# Patient Record
Sex: MALE | Race: WHITE | NOT HISPANIC OR LATINO | URBAN - METROPOLITAN AREA
[De-identification: names, ages, dates, MRNs, and addresses within clinical notes are randomized per-mention and may not be internally consistent; named-entity substitution may affect disease eponyms.]

---

## 2017-05-25 ENCOUNTER — LAB VISIT (OUTPATIENT)
Dept: LAB | Facility: HOSPITAL | Age: 42
End: 2017-05-25
Attending: FAMILY MEDICINE
Payer: COMMERCIAL

## 2017-05-25 ENCOUNTER — OFFICE VISIT (OUTPATIENT)
Dept: FAMILY MEDICINE | Facility: CLINIC | Age: 42
End: 2017-05-25
Attending: FAMILY MEDICINE
Payer: COMMERCIAL

## 2017-05-25 VITALS
OXYGEN SATURATION: 98 % | BODY MASS INDEX: 28.81 KG/M2 | WEIGHT: 231.69 LBS | SYSTOLIC BLOOD PRESSURE: 108 MMHG | TEMPERATURE: 99 F | DIASTOLIC BLOOD PRESSURE: 80 MMHG | HEIGHT: 75 IN | HEART RATE: 116 BPM

## 2017-05-25 DIAGNOSIS — Z00.00 LABORATORY EXAM ORDERED AS PART OF ROUTINE GENERAL MEDICAL EXAMINATION: ICD-10-CM

## 2017-05-25 DIAGNOSIS — R06.81 WITNESSED APNEIC SPELLS: ICD-10-CM

## 2017-05-25 DIAGNOSIS — R06.83 SNORING: ICD-10-CM

## 2017-05-25 DIAGNOSIS — Z00.00 ROUTINE GENERAL MEDICAL EXAMINATION AT A HEALTH CARE FACILITY: Primary | ICD-10-CM

## 2017-05-25 DIAGNOSIS — I10 ESSENTIAL HYPERTENSION: ICD-10-CM

## 2017-05-25 LAB
ALBUMIN SERPL BCP-MCNC: 4.3 G/DL
ALP SERPL-CCNC: 63 U/L
ALT SERPL W/O P-5'-P-CCNC: 23 U/L
ANION GAP SERPL CALC-SCNC: 12 MMOL/L
AST SERPL-CCNC: 20 U/L
BASOPHILS # BLD AUTO: 0.05 K/UL
BASOPHILS NFR BLD: 0.7 %
BILIRUB SERPL-MCNC: 0.9 MG/DL
BUN SERPL-MCNC: 24 MG/DL
CALCIUM SERPL-MCNC: 10 MG/DL
CHLORIDE SERPL-SCNC: 103 MMOL/L
CO2 SERPL-SCNC: 22 MMOL/L
COMPLEXED PSA SERPL-MCNC: 0.66 NG/ML
CREAT SERPL-MCNC: 1.3 MG/DL
DIFFERENTIAL METHOD: NORMAL
EOSINOPHIL # BLD AUTO: 0.5 K/UL
EOSINOPHIL NFR BLD: 7.2 %
ERYTHROCYTE [DISTWIDTH] IN BLOOD BY AUTOMATED COUNT: 12.6 %
EST. GFR  (AFRICAN AMERICAN): >60 ML/MIN/1.73 M^2
EST. GFR  (NON AFRICAN AMERICAN): >60 ML/MIN/1.73 M^2
GLUCOSE SERPL-MCNC: 97 MG/DL
HCT VFR BLD AUTO: 47.9 %
HGB BLD-MCNC: 16.2 G/DL
LYMPHOCYTES # BLD AUTO: 2.2 K/UL
LYMPHOCYTES NFR BLD: 31.3 %
MCH RBC QN AUTO: 29.3 PG
MCHC RBC AUTO-ENTMCNC: 33.8 %
MCV RBC AUTO: 87 FL
MONOCYTES # BLD AUTO: 0.4 K/UL
MONOCYTES NFR BLD: 6.2 %
NEUTROPHILS # BLD AUTO: 3.8 K/UL
NEUTROPHILS NFR BLD: 54.5 %
PLATELET # BLD AUTO: 250 K/UL
PMV BLD AUTO: 10.8 FL
POTASSIUM SERPL-SCNC: 3.9 MMOL/L
PROT SERPL-MCNC: 8 G/DL
RBC # BLD AUTO: 5.52 M/UL
SODIUM SERPL-SCNC: 137 MMOL/L
T4 FREE SERPL-MCNC: 1.42 NG/DL
TSH SERPL DL<=0.005 MIU/L-ACNC: 0.47 UIU/ML
WBC # BLD AUTO: 6.93 K/UL

## 2017-05-25 PROCEDURE — 85025 COMPLETE CBC W/AUTO DIFF WBC: CPT

## 2017-05-25 PROCEDURE — 83036 HEMOGLOBIN GLYCOSYLATED A1C: CPT

## 2017-05-25 PROCEDURE — 84443 ASSAY THYROID STIM HORMONE: CPT

## 2017-05-25 PROCEDURE — 99999 PR PBB SHADOW E&M-NEW PATIENT-LVL III: CPT | Mod: PBBFAC,,, | Performed by: FAMILY MEDICINE

## 2017-05-25 PROCEDURE — 84439 ASSAY OF FREE THYROXINE: CPT

## 2017-05-25 PROCEDURE — 80053 COMPREHEN METABOLIC PANEL: CPT

## 2017-05-25 PROCEDURE — 36415 COLL VENOUS BLD VENIPUNCTURE: CPT | Mod: PO

## 2017-05-25 PROCEDURE — 99386 PREV VISIT NEW AGE 40-64: CPT | Mod: S$GLB,,, | Performed by: FAMILY MEDICINE

## 2017-05-25 PROCEDURE — 80061 LIPID PANEL: CPT

## 2017-05-25 PROCEDURE — 84153 ASSAY OF PSA TOTAL: CPT

## 2017-05-25 RX ORDER — CHLORTHALIDONE 25 MG/1
25 TABLET ORAL DAILY
COMMUNITY
End: 2017-05-29 | Stop reason: SDUPTHER

## 2017-05-25 RX ORDER — LISINOPRIL 20 MG/1
20 TABLET ORAL NIGHTLY
COMMUNITY
End: 2017-05-29 | Stop reason: SDUPTHER

## 2017-05-25 NOTE — PROGRESS NOTES
Subjective:       Patient ID: RICK Sanford is a 41 y.o. male.    Chief Complaint: Establish Care    HPI     The patient presents to the office today requesting a routine periodic health examination.  This is his first visit with me.    Patient Active Problem List   Diagnosis    Snoring    Witnessed apneic spells    Hypertension       Past Surgical History:   Procedure Laterality Date    WRIST SURGERY Left 1990         Current Outpatient Prescriptions:     chlorthalidone (HYGROTEN) 25 MG Tab, Take 25 mg by mouth once daily., Disp: , Rfl:     lisinopril (PRINIVIL,ZESTRIL) 20 MG tablet, Take 20 mg by mouth every evening., Disp: , Rfl:     Review of patient's allergies indicates:  No Known Allergies    Family History   Problem Relation Age of Onset    No Known Problems Mother     Cancer Father     Heart disease Sister     Breast cancer Maternal Grandmother     Cancer Maternal Grandfather     No Known Problems Son        Social History     Social History    Marital status:      Spouse name: N/A    Number of children: 1    Years of education: N/A     Occupational History    Psychiatrist      [employer]     Social History Main Topics    Smoking status: Never Smoker    Smokeless tobacco: Not on file    Alcohol use No    Drug use: No    Sexual activity: Yes     Partners: Female     Other Topics Concern    Not on file     Social History Narrative    The patient does not exercise regularly     He is not satisfied with weight.    Rates diet as fair to poor.    He does not drink at least 1/2 gallon water daily.    He drinks coffee/tea/caffeine-containing soft drinks daily.    Total sleep time at night is 7 hours.    He works 40 hours per week.    He does wear seat belts.    Hobbies include none.               Patient Care Team:  Britton Mujica Jr., MD as PCP - General (Family Medicine)    Review of Systems   Constitutional: Negative for activity change and unexpected weight change.   HENT: Negative  for hearing loss, rhinorrhea and trouble swallowing.    Eyes: Negative for discharge and visual disturbance.   Respiratory: Negative for chest tightness and wheezing.    Cardiovascular: Negative for chest pain and palpitations.   Gastrointestinal: Negative for blood in stool, constipation, diarrhea and vomiting.   Endocrine: Positive for polyuria. Negative for polydipsia.   Genitourinary: Positive for urgency. Negative for difficulty urinating and hematuria.   Musculoskeletal: Negative for arthralgias and joint swelling.   Neurological: Positive for headaches. Negative for weakness.   Psychiatric/Behavioral: Positive for sleep disturbance. Negative for confusion and dysphoric mood. Self-injury: early awakenings; snores with apnea witnessed by wife.       Objective:      Physical Exam   Constitutional: He is oriented to person, place, and time. He appears well-developed and well-nourished. He is cooperative.   HENT:   Head: Normocephalic and atraumatic.   Right Ear: External ear normal.   Left Ear: External ear normal.   Nose: Nose normal.   Mouth/Throat: Oropharynx is clear and moist and mucous membranes are normal. No oropharyngeal exudate.   Eyes: Conjunctivae are normal. No scleral icterus.   Neck: Neck supple. No JVD present. Carotid bruit is not present. No thyromegaly present.   Cardiovascular: Normal rate, regular rhythm, normal heart sounds and normal pulses.  Exam reveals no gallop and no friction rub.    No murmur heard.  Pulmonary/Chest: Effort normal and breath sounds normal. He has no wheezes. He has no rhonchi. He has no rales.   Abdominal: Soft. Bowel sounds are normal. He exhibits no distension and no mass. There is no splenomegaly or hepatomegaly. There is no tenderness.   Musculoskeletal: Normal range of motion. He exhibits no edema or tenderness.   Lymphadenopathy:     He has no cervical adenopathy.     He has no axillary adenopathy.   Neurological: He is alert and oriented to person, place, and  "time. He has normal strength and normal reflexes. No cranial nerve deficit or sensory deficit. Coordination normal.   Skin: Skin is warm and dry.   Psychiatric: He has a normal mood and affect.   Vitals reviewed.        Assessment:       1. Routine general medical examination at a health care facility    2. Essential hypertension    3. Snoring    4. Witnessed apneic spells    5. Laboratory exam ordered as part of routine general medical examination          Plan:       Labs (see Orders).  Refer for Sleep Clinic evaluation.  Discussed with patient the importance of lifestyle modifications, including well-balanced diet and moderate exercise regimen, in reducing risk for cardiovascular/cerebrovascular disease and diabetes.  Discussed routine examinations and screenings.  We will call the patient with results & make further recommendations at that time.      "This note will not be shared with the patient."  "

## 2017-05-25 NOTE — MEDICAL/APP STUDENT
Subjective:       Patient ID: RICK Sanford is a 41 y.o. male.    Chief Complaint: [facility]    HPI   Patient is a 42 yo man with a PMH of Hypertension who presents  to establish care. He recently moved from [facility]. His blood pressure is currently well controlled on Lisinopril 20mg and Chlorithalidone 25mg. He reports a daily dry cough since he started the Lisinopril 2 years ago. He denies any lightheadedness or dizziness.   He endorses poor sleep quality over the past few years and wakes up multiple times a night. He states that his wife reports him snoring and has witnessed some apneic episodes. He reports fatigue and daily headaches. He denies any daytime sleepiness.    He also reports urinary frequency and urgency the past few months. He reports waking up 1-2x a night to urinate. He denies any polydipsia, dysuria, or hematuria.     Review of Systems   Constitutional: Positive for fatigue. Negative for fever and unexpected weight change.   HENT: Negative for congestion, postnasal drip, rhinorrhea and sneezing.    Eyes: Negative for photophobia and visual disturbance.   Respiratory: Positive for cough. Negative for shortness of breath.    Cardiovascular: Negative for chest pain, palpitations and leg swelling.   Gastrointestinal: Negative for abdominal pain, constipation, diarrhea, nausea and vomiting.   Endocrine: Negative for cold intolerance and polydipsia.   Genitourinary: Positive for frequency and urgency. Negative for dysuria and hematuria.   Musculoskeletal: Negative for arthralgias and myalgias.   Neurological: Positive for headaches. Negative for dizziness, weakness and light-headedness.   Psychiatric/Behavioral: Negative for dysphoric mood and sleep disturbance.       There is no problem list on file for this patient.      Past Surgical History:   Procedure Laterality Date    WRIST SURGERY Left 1990         Current Outpatient Prescriptions:     chlorthalidone (HYGROTEN) 25 MG Tab, Take  25 mg by mouth once daily., Disp: , Rfl:     lisinopril (PRINIVIL,ZESTRIL) 20 MG tablet, Take 20 mg by mouth every evening., Disp: , Rfl:     Review of patient's allergies indicates:  No Known Allergies    Family History   Problem Relation Age of Onset    No Known Problems Mother     Cancer Father     Heart disease Sister     Breast cancer Maternal Grandmother     Cancer Maternal Grandfather     No Known Problems Son        Social History     Social History    Marital status:      Spouse name: N/A    Number of children: 1    Years of education: N/A     Occupational History    Psychiatrist      [employer]     Social History Main Topics    Smoking status: Never Smoker    Smokeless tobacco: Not on file    Alcohol use No    Drug use: No    Sexual activity: Yes     Partners: Female     Other Topics Concern    Not on file     Social History Narrative    The patient does not exercise regularly     He is not satisfied with weight.    Rates diet as fair to poor.    He does not drink at least 1/2 gallon water daily.    He drinks coffee/tea/caffeine-containing soft drinks daily.    Total sleep time at night is 7 hours.               Patient Care Team:  Britton Mujica Jr., MD as PCP - General (Family Medicine)    Objective:      Physical Exam   Constitutional: He is oriented to person, place, and time. He appears well-developed and well-nourished.   HENT:   Head: Normocephalic and atraumatic.   Right Ear: External ear normal.   Left Ear: External ear normal.   Eyes: Conjunctivae and EOM are normal.   Neck: Normal range of motion. Neck supple. No JVD present.   Cardiovascular: Normal rate, regular rhythm and normal heart sounds.    No murmur heard.  Pulmonary/Chest: Effort normal and breath sounds normal. He has no wheezes. He has no rales.   Abdominal: Soft. Bowel sounds are normal. There is no tenderness.   Musculoskeletal: Normal range of motion. He exhibits no edema.   Neurological: He is alert and oriented  to person, place, and time.   Skin: Skin is warm and dry.   Psychiatric: He has a normal mood and affect.       Assessment:   Hypertension  Obstructive Sleep Apnea  Urinary Frequency  Plan:   Hypertension   Change Lisinopril 20mg to Losartan 50mg due to  Cough  Urinary Frequency   PSA, Fasting Blood Glucose, Urinalysis  Obstructive Sleep Apnea   Referral for sleep study   Health Maintenance    Lipid Panel

## 2017-05-26 LAB
ESTIMATED AVG GLUCOSE: 114 MG/DL
HBA1C MFR BLD HPLC: 5.6 %

## 2017-05-29 ENCOUNTER — PATIENT MESSAGE (OUTPATIENT)
Dept: FAMILY MEDICINE | Facility: CLINIC | Age: 42
End: 2017-05-29

## 2017-05-29 RX ORDER — CHLORTHALIDONE 25 MG/1
25 TABLET ORAL DAILY
Qty: 30 TABLET | Refills: 0 | Status: SHIPPED | OUTPATIENT
Start: 2017-05-29 | End: 2017-06-13 | Stop reason: ALTCHOICE

## 2017-05-29 RX ORDER — LISINOPRIL 20 MG/1
20 TABLET ORAL NIGHTLY
Qty: 30 TABLET | Refills: 0 | Status: SHIPPED | OUTPATIENT
Start: 2017-05-29 | End: 2017-06-13 | Stop reason: SDUPTHER

## 2017-06-01 ENCOUNTER — PATIENT MESSAGE (OUTPATIENT)
Dept: FAMILY MEDICINE | Facility: CLINIC | Age: 42
End: 2017-06-01

## 2017-06-01 PROBLEM — Z91.89 FRAMINGHAM CARDIAC RISK <10% IN NEXT 10 YEARS: Status: ACTIVE | Noted: 2017-06-01

## 2017-06-01 LAB
CHOLEST SERPL-MCNC: 232 MG/DL (ref 100–199)
HDL SERPL QN: 45 NM
HDL SERPL QN: 8.4 NM
HDL SERPL-SCNC: 22.6 UMOL/L
HDLC SERPL-MCNC: 45 MG/DL
HLD.LARGE SERPL-SCNC: <1.3 UMOL/L
LDL MED SERPL QN: 21.5 NM
LDL SERPL QN: 21.5 NM
LDL SERPL-SCNC: 2177 NMOL/L
LDL SMALL SERPL-SCNC: 656 NMOL/L
LDL SMALL SERPL-SCNC: 656 NMOL/L
LDLC SERPL CALC-MCNC: 167 MG/DL (ref 0–99)
TRIGL SERPL-MCNC: 102 MG/DL (ref 0–149)
VLDL LARGE SERPL-SCNC: 1.2 NMOL/L
VLDL SERPL QN: 41.4 NM

## 2017-06-12 ENCOUNTER — PATIENT MESSAGE (OUTPATIENT)
Dept: FAMILY MEDICINE | Facility: CLINIC | Age: 42
End: 2017-06-12

## 2017-06-13 RX ORDER — LISINOPRIL 20 MG/1
20 TABLET ORAL NIGHTLY
Qty: 30 TABLET | Refills: 0 | Status: SHIPPED | OUTPATIENT
Start: 2017-06-13 | End: 2017-07-25 | Stop reason: SDUPTHER

## 2017-06-13 RX ORDER — LISINOPRIL 20 MG/1
20 TABLET ORAL NIGHTLY
Qty: 30 TABLET | Refills: 0 | Status: SHIPPED | OUTPATIENT
Start: 2017-06-13 | End: 2017-06-13 | Stop reason: SDUPTHER

## 2017-06-14 ENCOUNTER — PATIENT MESSAGE (OUTPATIENT)
Dept: FAMILY MEDICINE | Facility: CLINIC | Age: 42
End: 2017-06-14

## 2017-06-30 ENCOUNTER — PATIENT MESSAGE (OUTPATIENT)
Dept: FAMILY MEDICINE | Facility: CLINIC | Age: 42
End: 2017-06-30

## 2017-07-03 ENCOUNTER — OFFICE VISIT (OUTPATIENT)
Dept: FAMILY MEDICINE | Facility: CLINIC | Age: 42
End: 2017-07-03
Attending: FAMILY MEDICINE
Payer: COMMERCIAL

## 2017-07-03 ENCOUNTER — PATIENT MESSAGE (OUTPATIENT)
Dept: FAMILY MEDICINE | Facility: CLINIC | Age: 42
End: 2017-07-03

## 2017-07-03 VITALS
HEIGHT: 75 IN | WEIGHT: 236.31 LBS | HEART RATE: 118 BPM | SYSTOLIC BLOOD PRESSURE: 120 MMHG | OXYGEN SATURATION: 97 % | DIASTOLIC BLOOD PRESSURE: 88 MMHG | BODY MASS INDEX: 29.38 KG/M2

## 2017-07-03 DIAGNOSIS — R06.81 WITNESSED APNEIC SPELLS: ICD-10-CM

## 2017-07-03 DIAGNOSIS — I10 ESSENTIAL HYPERTENSION: Primary | ICD-10-CM

## 2017-07-03 DIAGNOSIS — R39.15 URINARY URGENCY: ICD-10-CM

## 2017-07-03 DIAGNOSIS — R06.83 SNORING: ICD-10-CM

## 2017-07-03 LAB
BACTERIA #/AREA URNS AUTO: NORMAL /HPF
BILIRUB UR QL STRIP: NEGATIVE
CLARITY UR REFRACT.AUTO: CLEAR
COLOR UR AUTO: YELLOW
GLUCOSE UR QL STRIP: NEGATIVE
HGB UR QL STRIP: ABNORMAL
HYALINE CASTS UR QL AUTO: 1 /LPF
KETONES UR QL STRIP: NEGATIVE
LEUKOCYTE ESTERASE UR QL STRIP: NEGATIVE
MICROSCOPIC COMMENT: NORMAL
NITRITE UR QL STRIP: NEGATIVE
PH UR STRIP: 5 [PH] (ref 5–8)
PROT UR QL STRIP: NEGATIVE
RBC #/AREA URNS AUTO: 1 /HPF (ref 0–4)
SP GR UR STRIP: 1.02 (ref 1–1.03)
SQUAMOUS #/AREA URNS AUTO: 0 /HPF
URN SPEC COLLECT METH UR: ABNORMAL
UROBILINOGEN UR STRIP-ACNC: NEGATIVE EU/DL
WBC #/AREA URNS AUTO: 1 /HPF (ref 0–5)

## 2017-07-03 PROCEDURE — 99999 PR PBB SHADOW E&M-EST. PATIENT-LVL III: CPT | Mod: PBBFAC,,, | Performed by: FAMILY MEDICINE

## 2017-07-03 PROCEDURE — 81001 URINALYSIS AUTO W/SCOPE: CPT

## 2017-07-03 PROCEDURE — 99213 OFFICE O/P EST LOW 20 MIN: CPT | Mod: S$GLB,,, | Performed by: FAMILY MEDICINE

## 2017-07-03 NOTE — MEDICAL/APP STUDENT
Subjective:       Patient ID: RICK Sanford is a 42 y.o. male.    Chief Complaint: Hypertension    HPI   Patient is here for follow up for hypertension. He was taking Lisinopril 20 mg and chlorthalidone. The chlorthalidone was discontinued due to urinary frequency. He states his blood pressures have been elevated on his home readings. He has brought his log in which shows mostly elevations in diastolic pressured to 100s. He did not bring his blood pressure cuff in to confirm its accuracy. He continues to have urinary frequency at least 1x an hour and throughout the night. He denies polydipsia and recent lab work did not demonstrate elevated blood glucose levels. He denies any dysuria, urgency, incomplete emptying or weak stream. Recent PSA WNL.    Patient has been having dull bilateral temporal headaches in the last 2 weeks. He states they usually come on in the afternoon and are relieved by Excedrin. He denies photophobia, nausea, vomiting, aura, or tension in the neck.    .Review of Systems   Constitutional: Negative for fatigue and fever.   HENT: Negative for congestion, rhinorrhea, sinus pressure and sneezing.    Eyes: Negative for pain and redness.   Respiratory: Positive for cough. Negative for shortness of breath.    Cardiovascular: Negative for chest pain and palpitations.   Gastrointestinal: Negative for abdominal distention, abdominal pain, constipation, diarrhea and vomiting.   Endocrine: Positive for polyuria. Negative for polydipsia.   Genitourinary: Positive for frequency. Negative for decreased urine volume, difficulty urinating, dysuria and urgency.   Musculoskeletal: Negative for arthralgias and myalgias.   Neurological: Positive for headaches. Negative for dizziness.       Patient Active Problem List    Diagnosis Date Noted    [place] cardiac risk <10% in next 10 years 06/01/2017    Snoring 05/25/2017    Witnessed apneic spells 05/25/2017    Hypertension      Current Outpatient  Prescriptions on File Prior to Visit   Medication Sig Dispense Refill    lisinopril (PRINIVIL,ZESTRIL) 20 MG tablet Take 1 tablet (20 mg total) by mouth every evening. 30 tablet 0     No current facility-administered medications on file prior to visit.        Objective:       Vitals:    07/03/17 0745   BP: 120/88   Pulse: (!) 118       Physical Exam   Constitutional: He is oriented to person, place, and time. He appears well-developed and well-nourished.   HENT:   Head: Normocephalic and atraumatic.   Right Ear: External ear normal.   Left Ear: External ear normal.   Eyes: EOM are normal. Pupils are equal, round, and reactive to light.   Neck: Normal range of motion. Neck supple. No JVD present.   Cardiovascular: Normal rate, regular rhythm and normal heart sounds.    No murmur heard.  Pulmonary/Chest: Effort normal and breath sounds normal. He has no wheezes. He has no rales.   Abdominal: Soft. Bowel sounds are normal. There is no tenderness.   Musculoskeletal: Normal range of motion. He exhibits no edema.   Neurological: He is alert and oriented to person, place, and time.   Skin: Skin is warm and dry.       Assessment:   Hypertension  Urinary Frequency   Plan:   Hypertension   Blood pressure within limits today. Most likely has inaccurate home readings. Encrouraged to bring  blood pressure cuff to test accuracy    Continue Lisinopril 20 mg, hold Chlorthalidone   Urinary Frequency   Urinalysis today    Will follow up with recommendations

## 2017-07-03 NOTE — PROGRESS NOTES
"Subjective:       Patient ID: RICK Sanford is a 42 y.o. male.    Chief Complaint: Hypertension    HPI     The patient returns to the office today for follow-up.  After his last visit, chlorthalidone was discontinued.  However, the patient still complains of occasional urinary urgency.  He denies any decreased force of urinary stream, polyuria, or hematuria; he does have some frequency issues.  However, with each urination, his volume is very small.    His blood pressures at home have been fairly well controlled.  However, "at the drugstore," his blood pressures have been elevated.      Patient Active Problem List   Diagnosis    Snoring    Witnessed apneic spells    Hypertension    [place] cardiac risk <10% in next 10 years       Current Outpatient Prescriptions:     lisinopril (PRINIVIL,ZESTRIL) 20 MG tablet, Take 1 tablet (20 mg total) by mouth every evening., Disp: 30 tablet, Rfl: 0    The following portions of the patient's history were reviewed and updated as appropriate: allergies, past family history, past medical history, past social history and past surgical history.    Review of Systems   Respiratory: Negative for shortness of breath.    Cardiovascular: Negative for chest pain and palpitations.   Musculoskeletal: Negative for neck pain.   Neurological: Positive for headaches.       Objective:        /88 (BP Location: Left arm, Patient Position: Sitting, BP Method: Manual)   Pulse (!) 118 Comment: patient walked quickly to the office. patient lives 5 blocks  Ht 6' 3" (1.905 m)   Wt 107.2 kg (236 lb 4.8 oz)   SpO2 97%   BMI 29.54 kg/m²     Physical Exam   Constitutional: He is oriented to person, place, and time. He appears well-developed and well-nourished.   HENT:   Head: Normocephalic and atraumatic.   Eyes: Conjunctivae are normal. No scleral icterus.   Neck: No JVD present.   Cardiovascular: Normal rate, regular rhythm and normal heart sounds.  Exam reveals no gallop.    No " "murmur heard.  Pulmonary/Chest: Breath sounds normal. He has no wheezes. He has no rales.   Musculoskeletal: He exhibits no edema.   Neurological: He is alert and oriented to person, place, and time.   Skin: Skin is warm and dry.       Assessment:       1. Essential hypertension    2. Urinary urgency    3. Snoring    4. Witnessed apneic spells        Plan:       Remain off chlorthalidone.  Continue to monitor blood pressure home (the patient obtaining new monitor for insurance company).  Again advised patient to bring monitor to next visit to check for accuracy.  Urinalysis today.  Proceed with sleep clinic referral as recently recommended.  We will call the patient with results & make further recommendations at that time.      "This note will not be shared with the patient."  "

## 2017-07-04 RX ORDER — CIPROFLOXACIN 500 MG/1
500 TABLET ORAL 2 TIMES DAILY
Qty: 10 TABLET | Refills: 0 | OUTPATIENT
Start: 2017-07-04 | End: 2017-07-05 | Stop reason: SDUPTHER

## 2017-07-05 ENCOUNTER — PATIENT MESSAGE (OUTPATIENT)
Dept: FAMILY MEDICINE | Facility: CLINIC | Age: 42
End: 2017-07-05

## 2017-07-05 RX ORDER — CIPROFLOXACIN 500 MG/1
500 TABLET ORAL 2 TIMES DAILY
Qty: 10 TABLET | Refills: 0 | OUTPATIENT
Start: 2017-07-05 | End: 2017-07-07 | Stop reason: SDUPTHER

## 2017-07-07 ENCOUNTER — PATIENT MESSAGE (OUTPATIENT)
Dept: FAMILY MEDICINE | Facility: CLINIC | Age: 42
End: 2017-07-07

## 2017-07-07 RX ORDER — CIPROFLOXACIN 500 MG/1
500 TABLET ORAL 2 TIMES DAILY
Qty: 10 TABLET | Refills: 0 | Status: SHIPPED | OUTPATIENT
Start: 2017-07-07 | End: 2017-07-12

## 2017-07-25 RX ORDER — LISINOPRIL 20 MG/1
20 TABLET ORAL NIGHTLY
Qty: 90 TABLET | Refills: 1 | Status: SHIPPED | OUTPATIENT
Start: 2017-07-25 | End: 2017-09-05 | Stop reason: ALTCHOICE

## 2017-08-25 ENCOUNTER — OFFICE VISIT (OUTPATIENT)
Dept: SLEEP MEDICINE | Facility: CLINIC | Age: 42
End: 2017-08-25
Attending: FAMILY MEDICINE
Payer: COMMERCIAL

## 2017-08-25 ENCOUNTER — PATIENT MESSAGE (OUTPATIENT)
Dept: FAMILY MEDICINE | Facility: CLINIC | Age: 42
End: 2017-08-25

## 2017-08-25 VITALS
HEART RATE: 93 BPM | OXYGEN SATURATION: 97 % | BODY MASS INDEX: 29.22 KG/M2 | HEIGHT: 75 IN | DIASTOLIC BLOOD PRESSURE: 96 MMHG | SYSTOLIC BLOOD PRESSURE: 140 MMHG | WEIGHT: 235 LBS

## 2017-08-25 DIAGNOSIS — E66.3 OVERWEIGHT (BMI 25.0-29.9): ICD-10-CM

## 2017-08-25 DIAGNOSIS — G47.30 SLEEP APNEA, UNSPECIFIED: Primary | ICD-10-CM

## 2017-08-25 PROBLEM — R06.83 SNORING: Status: RESOLVED | Noted: 2017-05-25 | Resolved: 2017-08-25

## 2017-08-25 PROBLEM — R06.81 WITNESSED APNEIC SPELLS: Status: RESOLVED | Noted: 2017-05-25 | Resolved: 2017-08-25

## 2017-08-25 PROCEDURE — 3074F SYST BP LT 130 MM HG: CPT | Mod: S$GLB,,, | Performed by: NURSE PRACTITIONER

## 2017-08-25 PROCEDURE — 3080F DIAST BP >= 90 MM HG: CPT | Mod: S$GLB,,, | Performed by: NURSE PRACTITIONER

## 2017-08-25 PROCEDURE — 99204 OFFICE O/P NEW MOD 45 MIN: CPT | Mod: S$GLB,,, | Performed by: NURSE PRACTITIONER

## 2017-08-25 PROCEDURE — 3008F BODY MASS INDEX DOCD: CPT | Mod: S$GLB,,, | Performed by: NURSE PRACTITIONER

## 2017-08-25 PROCEDURE — 99999 PR PBB SHADOW E&M-EST. PATIENT-LVL III: CPT | Mod: PBBFAC,,, | Performed by: NURSE PRACTITIONER

## 2017-08-25 RX ORDER — HYDROCHLOROTHIAZIDE 12.5 MG/1
12.5 TABLET ORAL DAILY
Qty: 30 TABLET | Refills: 0 | Status: SHIPPED | OUTPATIENT
Start: 2017-08-25 | End: 2017-08-29 | Stop reason: SDUPTHER

## 2017-08-25 NOTE — PATIENT INSTRUCTIONS
Kayla or Romario will contact you to schedule your sleep study. Their number is [phone] (ext 2). The [facility] is located on 7th floor of the [facility].    We will call you when the sleep study results are ready - if you have not heard from us by 2 weeks from the date of the study, please call [phone] (ext 1).    You are advised to abstain from driving should you feel sleepy or drowsy.

## 2017-08-25 NOTE — PROGRESS NOTES
Tate Sanford  was seen as a new patient at the request of  Britton Mujica Jr., MD for the evaluation of obstructive sleep apnea.    CHIEF COMPLAINT:    Chief Complaint   Patient presents with    Sleep Apnea    Snoring    Fatigue       08/25/2017 JONATHAN Reyna NP: HISTORY OF PRESENT ILLNESS: Tate Sanford is a 42 y.o. male is here for sleep evaluation.       Patient complaints include: snoring, excessive daytime sleepiness, witnessed breathing apnea, and morning headaches (few times per week). Nocturia 2 - 4x.    5 / 8 STOP BANG Criteria  Pt has slept separately from wife for years because of snoring.   Denies difficulty initiating sleep.     Denies symptoms of restless legs or kicking during sleep.    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 Requires 45-day notice to take off from work to cancel clinic.     [place] Sleepiness Scale score during initial sleep evaluation was 5.    SLEEP ROUTINE:      Bed partner:  Has wife, but sleeps separately   Time to bed:  9 - 10 pm  Sleep onset latency:  <1 minute        Disruptions or awakenings:  2 - 4    Wakeup time:    6 am  Perceived sleep quality:  1/5       PAST MEDICAL HISTORY:    Active Ambulatory Problems     Diagnosis Date Noted    Hypertension     [place] cardiac risk <10% in next 10 years 06/01/2017     Resolved Ambulatory Problems     Diagnosis Date Noted    Snoring 05/25/2017    Witnessed apneic spells 05/25/2017     Past Medical History:   Diagnosis Date    Hypertension                 PAST SURGICAL HISTORY:    Past Surgical History:   Procedure Laterality Date    WRIST SURGERY Left 1990         FAMILY HISTORY:                Family History   Problem Relation Age of Onset    No Known Problems Mother     Cancer Father     Heart disease Sister     Breast cancer Maternal Grandmother     Cancer Maternal Grandfather     No Known Problems Son        SOCIAL HISTORY:          Tobacco:   History   Smoking Status    Never  "Smoker   Smokeless Tobacco    Not on file       Alcohol use:    History   Alcohol Use No                 ALLERGIES:  Review of patient's allergies indicates:  No Known Allergies    CURRENT MEDICATIONS:    Current Outpatient Prescriptions   Medication Sig Dispense Refill    lisinopril (PRINIVIL,ZESTRIL) 20 MG tablet Take 1 tablet (20 mg total) by mouth every evening. 90 tablet 1     No current facility-administered medications for this visit.                   REVIEW OF SYSTEMS:     Sleep related symptoms as per HPI.  CONST:Denies weight gain    HEENT: Sometimes sinus congestion  PULM: Denies dyspnea  CARD:  Denies palpitations   GI:  Denies acid reflux  : Denies polyuria  NEURO: Reports morning headaches  PSYCH: Denies mood disturbance  HEME: Denies anemia     Otherwise, a balance of systems reviewed is negative.          PHYSICAL EXAM:  Vitals:    08/25/17 0704   BP: (!) 134/104   Pulse: 93   SpO2: 97%   Weight: 106.6 kg (235 lb 0.2 oz)   Height: 6' 3" (1.905 m)   PainSc:   3   PainLoc: Head     Body mass index is 29.37 kg/m².     GENERAL: Normal development, well groomed  HEENT:  Conjunctivae are non-erythematous; Pupils equal, round, and reactive to light; Nose is symmetrical; Nasal mucosa is pink and moist; Septum is midline; Inferior turbinates are normal; Nasal airflow is normal; Posterior pharynx is pink; Modified Mallampati: III; Posterior palate is normal; Tonsils +1; Uvula is normal and pink;Tongue is normal; Dentition is fair; No TMJ tenderness; Jaw opening and protrusion without click and without discomfort.  NECK: Supple. Neck circumference is 14.5 inches. No thyromegaly. No palpable nodes.    SKIN: On face and neck: No abrasions, no rashes, no lesions.  No subcutaneous nodules are palpable.  RESPIRATORY: Chest is clear to auscultation.  Normal chest expansion and non-labored breathing at rest.  CARDIOVASCULAR: Normal S1, S2.  No murmurs, gallops or rubs. No carotid bruits bilaterally.  EXTREMITIES: " No edema. No clubbing. No cyanosis. Station normal. Gait normal.        NEURO/PSYCH: Oriented to time, place and person. Normal attention span and concentration. Affect is full. Mood is normal.                                              ASSESSMENT:    Sleep apnea, unspecified. The patient symptomatically has snoring, excessive daytime sleepiness, witnessed breathing apnea, and morning headaches with findings of crowded oral airway and elevated body mas index. Medical co-morbidities: essential hypertension and overweight BMI.  This warrants further investigation for possible obstructive sleep apnea.      PLAN:    - Diagnostic: HST ([facility], difficult for patient to come to Sleep Lab pickup/drop off [facility] because of required 45-day notice for time off at [employer]; mail-in/out option with [facility] most convenient). The nature of this procedure and its indication was discussed with the patient. Discussed with patient that if HST is negative or depending on severity of positive HST, in-lab sleep study may be necessary.  Patient will be contacted after sleep study is done. Email with results/treatment options via [facility]     - Education: During our discussion today, we talked about the etiology of obstructive sleep apnea as well as the potential ramifications of untreated sleep apnea, which could include daytime sleepiness, hypertension, heart disease and/or stroke. We discussed potential treatment options, which could include weight loss, body positioning, continuous positive airway pressure (CPAP), or referral for surgical consideration.     - Precautions: The patient was advised to abstain from driving should they feel sleepy  or drowsy.     Thank you for allowing me the opportunity to participate in the care of your patient.

## 2017-08-25 NOTE — LETTER
August 25, 2017      Britton Mujica Jr., MD  [address]  [address]  [facility] [address]           [facility]  [address]  [facility] [address]  Phone: [phone]          Patient: Tate Sanford   MR Number: [id number]   YOB: 1975   Date of Visit: 8/25/2017       Dear Dr. Britton Mujica Jr.:    Thank you for referring Tate Sanford to me for evaluation. Attached you will find relevant portions of my assessment and plan of care.    If you have questions, please do not hesitate to call me. I look forward to following Tate Sanford along with you.    Sincerely,    Mercy Reyna, NP    Enclosure  CC:  No Recipients    If you would like to receive this communication electronically, please contact [email or web address] or [phone] to request more information on [facility] access.    For providers and/or their staff who would like to refer a patient to [facility], please contact us through our one-stop-shop provider referral line, [facility], at [phone].    If you feel you have received this communication in error or would no longer like to receive these types of communications, please e-mail [email or web address]

## 2017-08-28 ENCOUNTER — PATIENT MESSAGE (OUTPATIENT)
Dept: SLEEP MEDICINE | Facility: CLINIC | Age: 42
End: 2017-08-28

## 2017-08-29 ENCOUNTER — PATIENT MESSAGE (OUTPATIENT)
Dept: FAMILY MEDICINE | Facility: CLINIC | Age: 42
End: 2017-08-29

## 2017-08-29 RX ORDER — HYDROCHLOROTHIAZIDE 12.5 MG/1
12.5 TABLET ORAL DAILY
Qty: 30 TABLET | Refills: 0 | Status: SHIPPED | OUTPATIENT
Start: 2017-08-29 | End: 2017-09-05 | Stop reason: ALTCHOICE

## 2017-09-01 ENCOUNTER — PATIENT MESSAGE (OUTPATIENT)
Dept: SLEEP MEDICINE | Facility: CLINIC | Age: 42
End: 2017-09-01

## 2017-09-05 ENCOUNTER — LAB VISIT (OUTPATIENT)
Dept: LAB | Facility: HOSPITAL | Age: 42
End: 2017-09-05
Attending: FAMILY MEDICINE
Payer: COMMERCIAL

## 2017-09-05 ENCOUNTER — OFFICE VISIT (OUTPATIENT)
Dept: FAMILY MEDICINE | Facility: CLINIC | Age: 42
End: 2017-09-05
Attending: FAMILY MEDICINE
Payer: COMMERCIAL

## 2017-09-05 VITALS
BODY MASS INDEX: 29.53 KG/M2 | HEIGHT: 75 IN | DIASTOLIC BLOOD PRESSURE: 76 MMHG | HEART RATE: 95 BPM | WEIGHT: 237.5 LBS | OXYGEN SATURATION: 98 % | SYSTOLIC BLOOD PRESSURE: 120 MMHG

## 2017-09-05 DIAGNOSIS — I10 BENIGN HYPERTENSION: ICD-10-CM

## 2017-09-05 DIAGNOSIS — I10 BENIGN HYPERTENSION: Primary | ICD-10-CM

## 2017-09-05 LAB
ANION GAP SERPL CALC-SCNC: 9 MMOL/L
BUN SERPL-MCNC: 14 MG/DL
CALCIUM SERPL-MCNC: 9.4 MG/DL
CHLORIDE SERPL-SCNC: 104 MMOL/L
CO2 SERPL-SCNC: 27 MMOL/L
CREAT SERPL-MCNC: 1.2 MG/DL
EST. GFR  (AFRICAN AMERICAN): >60 ML/MIN/1.73 M^2
EST. GFR  (NON AFRICAN AMERICAN): >60 ML/MIN/1.73 M^2
GLUCOSE SERPL-MCNC: 94 MG/DL
POTASSIUM SERPL-SCNC: 4.2 MMOL/L
SODIUM SERPL-SCNC: 140 MMOL/L

## 2017-09-05 PROCEDURE — 90715 TDAP VACCINE 7 YRS/> IM: CPT | Mod: S$GLB,,, | Performed by: FAMILY MEDICINE

## 2017-09-05 PROCEDURE — 90471 IMMUNIZATION ADMIN: CPT | Mod: S$GLB,,, | Performed by: FAMILY MEDICINE

## 2017-09-05 PROCEDURE — 80048 BASIC METABOLIC PNL TOTAL CA: CPT

## 2017-09-05 PROCEDURE — 99999 PR PBB SHADOW E&M-EST. PATIENT-LVL II: CPT | Mod: PBBFAC,,, | Performed by: FAMILY MEDICINE

## 2017-09-05 PROCEDURE — 3008F BODY MASS INDEX DOCD: CPT | Mod: S$GLB,,, | Performed by: FAMILY MEDICINE

## 2017-09-05 PROCEDURE — 99213 OFFICE O/P EST LOW 20 MIN: CPT | Mod: 25,S$GLB,, | Performed by: FAMILY MEDICINE

## 2017-09-05 PROCEDURE — 3074F SYST BP LT 130 MM HG: CPT | Mod: S$GLB,,, | Performed by: FAMILY MEDICINE

## 2017-09-05 PROCEDURE — 36415 COLL VENOUS BLD VENIPUNCTURE: CPT | Mod: PO

## 2017-09-05 PROCEDURE — 3078F DIAST BP <80 MM HG: CPT | Mod: S$GLB,,, | Performed by: FAMILY MEDICINE

## 2017-09-05 RX ORDER — LISINOPRIL AND HYDROCHLOROTHIAZIDE 12.5; 2 MG/1; MG/1
1 TABLET ORAL DAILY
Qty: 90 TABLET | Refills: 1 | Status: SHIPPED | OUTPATIENT
Start: 2017-09-05 | End: 2018-02-28 | Stop reason: SDUPTHER

## 2017-09-05 NOTE — PROGRESS NOTES
Patient was given Tdap IM in Left Deltoid as per orders from Dr. Mujica. Aseptic tech used and pt tolerated well. Pt was monitored for 15 mins with no reaction noted.

## 2017-09-05 NOTE — PROGRESS NOTES
"Subjective:      Patient here for follow-up of elevated blood pressure.  He is not exercising and is adherent to a low-salt diet.  Blood pressure is well controlled at home. Cardiac symptoms: none. Patient denies: chest pain, dyspnea, palpitations and lower extremity edema. Cardiovascular risk factors: none. Use of agents associated with hypertension: none. History of target organ damage: none.    The following portions of the patient's history were reviewed and updated as appropriate: allergies, current medications, past family history, past medical history, past social history, past surgical history and problem list.    Review of Systems  Pertinent items are noted in HPI.        Objective:      /76 (BP Location: Left arm, Patient Position: Sitting, BP Method: Small (Manual))   Pulse 95   Ht 6' 3" (1.905 m)   Wt 107.7 kg (237 lb 8 oz)   SpO2 98%   BMI 29.69 kg/m²   Lungs: clear to auscultation bilaterally  Heart: regular rate and rhythm, S1, S2 normal, no murmur, click, rub or gallop   Extremities: no edema.     Assessment:      Hypertension, normal blood pressure . Evidence of target organ damage: none.      Plan:      Medication: continue lisinopril/HCTZ.  Dietary sodium restriction.  Regular aerobic exercise.    TdaP today.    RTC 6 months.  "

## 2017-09-12 ENCOUNTER — TELEPHONE (OUTPATIENT)
Dept: SLEEP MEDICINE | Facility: CLINIC | Age: 42
End: 2017-09-12

## 2017-09-12 NOTE — TELEPHONE ENCOUNTER
Rachel from [facility] said because Axel has 2 account  with use for [facility] order that is interpreted by Provider from [facility] and the other is for normal orders where [facility] bill insurance and not [facility], which [facility] will get to interpret those orders.

## 2017-09-14 ENCOUNTER — TELEPHONE (OUTPATIENT)
Dept: SLEEP MEDICINE | Facility: CLINIC | Age: 42
End: 2017-09-14

## 2017-09-14 NOTE — TELEPHONE ENCOUNTER
I was informed by Ann pt order for HST was a bit delay due to the fact that they had to call on Sept 8th to get benefit from insurance.  After they called pt twice he was unreachable.  He will be contacted for the last time on Saturday then if he doesn't answer, he will get a letter by Sept 23rd his order will be cancelled    Called pt. He did not answer.    Called pt wife serafin and she got the message for him and he will contact Kaya to schedule delivery for HST

## 2017-09-25 ENCOUNTER — PATIENT MESSAGE (OUTPATIENT)
Dept: SLEEP MEDICINE | Facility: CLINIC | Age: 42
End: 2017-09-25

## 2017-09-25 ENCOUNTER — TELEPHONE (OUTPATIENT)
Dept: SLEEP MEDICINE | Facility: CLINIC | Age: 42
End: 2017-09-25

## 2017-09-25 DIAGNOSIS — G47.33 OBSTRUCTIVE SLEEP APNEA: Primary | ICD-10-CM

## 2017-09-25 NOTE — TELEPHONE ENCOUNTER
----- Message from Mercy Reyna NP sent at 9/25/2017  7:07 AM CDT -----  Is Rina study interpreted or does Dr. Quezada have to interpret it?

## 2017-09-25 NOTE — TELEPHONE ENCOUNTER
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

## 2017-11-21 ENCOUNTER — OFFICE VISIT (OUTPATIENT)
Dept: SLEEP MEDICINE | Facility: CLINIC | Age: 42
End: 2017-11-21
Payer: COMMERCIAL

## 2017-11-21 VITALS
OXYGEN SATURATION: 96 % | SYSTOLIC BLOOD PRESSURE: 114 MMHG | WEIGHT: 233.81 LBS | BODY MASS INDEX: 29.07 KG/M2 | HEIGHT: 75 IN | HEART RATE: 80 BPM | DIASTOLIC BLOOD PRESSURE: 82 MMHG

## 2017-11-21 DIAGNOSIS — G47.33 OBSTRUCTIVE SLEEP APNEA HYPOPNEA, MILD: ICD-10-CM

## 2017-11-21 DIAGNOSIS — E66.3 OVERWEIGHT (BMI 25.0-29.9): ICD-10-CM

## 2017-11-21 PROCEDURE — 99999 PR PBB SHADOW E&M-EST. PATIENT-LVL III: CPT | Mod: PBBFAC,,, | Performed by: NURSE PRACTITIONER

## 2017-11-21 PROCEDURE — 99214 OFFICE O/P EST MOD 30 MIN: CPT | Mod: S$GLB,,, | Performed by: NURSE PRACTITIONER

## 2017-11-21 NOTE — PROGRESS NOTES
Tate Sanford was seen in follow-up for LOLITA management and CPAP equipment check after set up.     CHIEF COMPLAINT:    Chief Complaint   Patient presents with    Sleep Apnea       08/25/2017 JONATHAN Reyna NP: HISTORY OF PRESENT ILLNESS: Tate Sanford is a 42 y.o. male is here for sleep evaluation.       Patient complaints include: snoring, excessive daytime sleepiness, witnessed breathing apnea, and morning headaches (few times per week). Nocturia 2 - 4x.    5 / 8 STOP BANG Criteria  Pt has slept separately from wife for years because of snoring.   Denies difficulty initiating sleep.     Denies symptoms of restless legs or kicking during sleep.    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 Requires 45-day notice to take off from work to cancel clinic.     [place] Sleepiness Scale score during initial sleep evaluation was 5.    SLEEP ROUTINE:      Bed partner:  Has wife, but sleeps separately   Time to bed:  9 - 10 pm  Sleep onset latency:  <1 minute        Disruptions or awakenings:  2 - 4    Wakeup time:    6 am  Perceived sleep quality:  1/5       INTERVAL HISTORY:    11/21/2017 JONATHAN Reyna NP: Since pt last visit he has completed home sleep study on 09/21/2017 and got set up with auto CPAP on 10/09/2017. Reviewed HST results again in person. Pt complaints of snoring, excessive daytime sleepiness, witnessed breathing apnea, and morning headaches resolved with CPAP use. Nocturia down to 2 from 4.  Denies oral/nasal drying with Dreamwear nasal mask. Denies pressure intolerance. ESS 6.     CPAP Interrogation: DreamStation 6 - 20 cm  Compliance Summary Days with Device Usage: 30 days Percentage of Days >=4 Hours: 96.7% Average Usage (Days Used): 7 hrs. 59 mins. 20 secs. Average Usage (All Days): 7 hrs. 59 mins. 20 secs.   Apnea Indices Average AHI: 2.2 Average OA Index: 0.2 Average CA Index: 0.3   Large Leak Average Time in Large Leak: 2 mins. 4 secs. Average % of Night in Large Leak:  "0.4%  Periodic Breathing Average % of Night in PB: 0.1%  90%tile: 7.1 cm    Baseline Sleep Study: 09/21/2017 HST via Novasom AHI 5.2 with oxygen francisco 87%    PAST MEDICAL HISTORY:    Active Ambulatory Problems     Diagnosis Date Noted    Hypertension     [place] cardiac risk <10% in next 10 years 06/01/2017     Resolved Ambulatory Problems     Diagnosis Date Noted    Snoring 05/25/2017    Witnessed apneic spells 05/25/2017     Past Medical History:   Diagnosis Date    Hypertension                 PAST SURGICAL HISTORY:    Past Surgical History:   Procedure Laterality Date    WRIST SURGERY Left 1990         FAMILY HISTORY:                Family History   Problem Relation Age of Onset    No Known Problems Mother     Cancer Father     Heart disease Sister     Breast cancer Maternal Grandmother     Cancer Maternal Grandfather     No Known Problems Son        SOCIAL HISTORY:          Tobacco:   History   Smoking Status    Never Smoker   Smokeless Tobacco    Not on file       Alcohol use:    History   Alcohol Use No                 ALLERGIES:  Review of patient's allergies indicates:  No Known Allergies    CURRENT MEDICATIONS:    Current Outpatient Prescriptions   Medication Sig Dispense Refill    lisinopril-hydrochlorothiazide (PRINZIDE,ZESTORETIC) 20-12.5 mg per tablet Take 1 tablet by mouth once daily. 90 tablet 1     No current facility-administered medications for this visit.                   REVIEW OF SYSTEMS:     Sleep related symptoms as per HPI.  CONST:Denies weight gain    HEENT: Sometimes sinus congestion  PULM: Denies dyspnea  CARD:  Denies palpitations   GI:  Denies acid reflux  : Denies polyuria  NEURO: Reports morning headaches  PSYCH: Denies mood disturbance  HEME: Denies anemia     Otherwise, a balance of systems reviewed is negative.          PHYSICAL EXAM:    /82 (BP Location: Right arm, Patient Position: Sitting, BP Method: Large (Manual))   Pulse 80   Ht 6' 3" (1.905 m)   " Wt 106.1 kg (233 lb 12.8 oz)   SpO2 96%   BMI 29.22 kg/m²     GENERAL: Normal development, well groomed  HEENT:  Conjunctivae are non-erythematous; Pupils equal, round, and reactive to light; Nose is symmetrical; Nasal mucosa is pink and moist; Septum is midline; Inferior turbinates are normal; Nasal airflow is normal; Posterior pharynx is pink; Modified Mallampati: III; Posterior palate is normal; Tonsils +1; Uvula is normal and pink;Tongue is normal; Dentition is fair; No TMJ tenderness; Jaw opening and protrusion without click and without discomfort.  NECK: Supple. Neck circumference is 14.5 inches. No thyromegaly. No palpable nodes.    SKIN: On face and neck: No abrasions, no rashes, no lesions.  No subcutaneous nodules are palpable.  RESPIRATORY: Chest is clear to auscultation.  Normal chest expansion and non-labored breathing at rest.  CARDIOVASCULAR: Normal S1, S2.  No murmurs, gallops or rubs. No carotid bruits bilaterally.  EXTREMITIES: No edema. No clubbing. No cyanosis. Station normal. Gait normal.        NEURO/PSYCH: Oriented to time, place and person. Normal attention span and concentration. Affect is full. Mood is normal.                                              ASSESSMENT:    Obstructive sleep apnea, mild by AHI. The patient symptomatically has snoring, excessive daytime sleepiness, witnessed breathing apnea, and morning headaches now resolved with CPAP use. The patient is adherent on CPAP and experiencing symptomatic benefit.  Medical co-morbidities: essential hypertension and overweight BMI.  This warrants treatment for LOLITA.     Overweight BMI, discussed relationship between LOLITA and weight    PLAN:      Treatment  -continue auto CPAP; pressure adjusted from 6 - 20 cm to 4 - 12 cm  -Counseling regarding benefits of healthy eating and physical activity (150 minutes of moderate-intensity aerobic activity per week) for weight reduction which can improve overall health.   -RTC 6 months, then  annually thereafter    Education: During our discussion today, we talked about the etiology of obstructive sleep apnea as well as the potential ramifications of untreated sleep apnea, which could include daytime sleepiness, hypertension, heart disease and/or stroke. We discussed potential treatment options, which could include weight loss, body positioning, continuous positive airway pressure (CPAP), or referral for surgical consideration.     Precautions: The patient was advised to abstain from driving should they feel sleepy  or drowsy.

## 2018-02-28 RX ORDER — LISINOPRIL AND HYDROCHLOROTHIAZIDE 12.5; 2 MG/1; MG/1
1 TABLET ORAL DAILY
Qty: 90 TABLET | Refills: 1 | Status: SHIPPED | OUTPATIENT
Start: 2018-02-28 | End: 2018-08-20 | Stop reason: SDUPTHER

## 2018-05-18 ENCOUNTER — OFFICE VISIT (OUTPATIENT)
Dept: DERMATOLOGY | Facility: CLINIC | Age: 43
End: 2018-05-18
Payer: COMMERCIAL

## 2018-05-18 VITALS — WEIGHT: 233 LBS | BODY MASS INDEX: 29.12 KG/M2

## 2018-05-18 DIAGNOSIS — L82.1 SK (SEBORRHEIC KERATOSIS): ICD-10-CM

## 2018-05-18 DIAGNOSIS — B36.0 TINEA VERSICOLOR: Primary | ICD-10-CM

## 2018-05-18 DIAGNOSIS — D22.9 NEVUS OF MULTIPLE SITES: ICD-10-CM

## 2018-05-18 PROCEDURE — 99203 OFFICE O/P NEW LOW 30 MIN: CPT | Mod: S$GLB,,, | Performed by: DERMATOLOGY

## 2018-05-18 PROCEDURE — 99999 PR PBB SHADOW E&M-EST. PATIENT-LVL III: CPT | Mod: PBBFAC,,, | Performed by: DERMATOLOGY

## 2018-05-18 PROCEDURE — 3008F BODY MASS INDEX DOCD: CPT | Mod: CPTII,S$GLB,, | Performed by: DERMATOLOGY

## 2018-05-18 RX ORDER — FLUCONAZOLE 200 MG/1
TABLET ORAL
Qty: 4 TABLET | Refills: 0 | Status: SHIPPED | OUTPATIENT
Start: 2018-05-18 | End: 2019-01-30 | Stop reason: ALTCHOICE

## 2018-05-18 RX ORDER — KETOCONAZOLE 20 MG/ML
SHAMPOO, SUSPENSION TOPICAL
Qty: 120 ML | Refills: 6 | Status: SHIPPED | OUTPATIENT
Start: 2018-05-18 | End: 2019-01-30 | Stop reason: ALTCHOICE

## 2018-05-18 NOTE — PROGRESS NOTES
Subjective:       Patient ID:  Tate Sanford is a 42 y.o. male who presents for   Chief Complaint   Patient presents with    Mole     right groin    Skin Check     UBSE     History of Present Illness: The patient presents with chief complaint of spot.  Location: groin area  Duration: over a month  Signs/Symptoms: peeled off at top yesterday, not painful    Prior treatments: none    Also relates he has hx of tinea versicolor, none now.  Is physician, psychiatrist.         Review of Systems   Constitutional: Negative for fever.   Skin: Negative for itching and rash.   Hematologic/Lymphatic: Does not bruise/bleed easily.        Objective:    Physical Exam   Constitutional: He appears well-developed and well-nourished. No distress.   Neurological: He is alert and oriented to person, place, and time. He is not disoriented.   Psychiatric: He has a normal mood and affect.   Skin:   Areas Examined (abnormalities noted in diagram):   Scalp / Hair Palpated and Inspected  Head / Face Inspection Performed  Neck Inspection Performed  Chest / Axilla Inspection Performed  Abdomen Inspection Performed  Genitals / Buttocks / Groin Inspection Performed  Back Inspection Performed  RUE Inspected  LUE Inspection Performed  RLE Inspected  LLE Inspection Performed  Nails and Digits Inspection Performed              Diagram Legend     Erythematous scaling macule/papule c/w actinic keratosis       Vascular papule c/w angioma      Pigmented verrucoid papule/plaque c/w seborrheic keratosis      Yellow umbilicated papule c/w sebaceous hyperplasia      Irregularly shaped tan macule c/w lentigo     1-2 mm smooth white papules consistent with Milia      Movable subcutaneous cyst with punctum c/w epidermal inclusion cyst      Subcutaneous movable cyst c/w pilar cyst      Firm pink to brown papule c/w dermatofibroma      Pedunculated fleshy papule(s) c/w skin tag(s)      Evenly pigmented macule c/w junctional nevus     Mildly  "variegated pigmented, slightly irregular-bordered macule c/w mildly atypical nevus      Flesh colored to evenly pigmented papule c/w intradermal nevus       Pink pearly papule/plaque c/w basal cell carcinoma      Erythematous hyperkeratotic cursted plaque c/w SCC      Surgical scar with no sign of skin cancer recurrence      Open and closed comedones      Inflammatory papules and pustules      Verrucoid papule consistent consistent with wart     Erythematous eczematous patches and plaques     Dystrophic onycholytic nail with subungual debris c/w onychomycosis     Umbilicated papule    Erythematous-base heme-crusted tan verrucoid plaque consistent with inflamed seborrheic keratosis     Erythematous Silvery Scaling Plaque c/w Psoriasis     See annotation      Assessment / Plan:        Tinea versicolor by hx  -     fluconazole (DIFLUCAN) 200 MG Tab; 1 po biw x 2 weeks  Dispense: 4 tablet; Refill: 0 prn  -     ketoconazole (NIZORAL) 2 % shampoo; Apply topically every 7 days.  Dispense: 120 mL; Refill: 6    SK (seborrheic keratosis)  reassurance  Brochure provided      Nevus of multiple sites  The "ABCD" rules to observe pigmented lesions were reviewed.               Follow-up in about 2 years (around 5/18/2020).  "

## 2018-08-20 RX ORDER — LISINOPRIL AND HYDROCHLOROTHIAZIDE 12.5; 2 MG/1; MG/1
1 TABLET ORAL DAILY
Qty: 90 TABLET | Refills: 0 | Status: SHIPPED | OUTPATIENT
Start: 2018-08-20 | End: 2018-11-20 | Stop reason: SDUPTHER

## 2018-11-20 RX ORDER — LISINOPRIL AND HYDROCHLOROTHIAZIDE 12.5; 2 MG/1; MG/1
TABLET ORAL
Qty: 30 TABLET | Refills: 0 | Status: SHIPPED | OUTPATIENT
Start: 2018-11-20 | End: 2019-02-06 | Stop reason: SDUPTHER

## 2019-01-16 ENCOUNTER — PATIENT OUTREACH (OUTPATIENT)
Dept: ADMINISTRATIVE | Facility: HOSPITAL | Age: 44
End: 2019-01-16

## 2019-01-16 NOTE — PROGRESS NOTES
Dear Tate Sanford      Here at [facility] we care about you. After careful review it appears that you are in need of the following immunizations.....    Health Maintenance Due   Topic Date Due    Influenza Vaccine  08/01/2018             Please feel free to contact your primary care physician  Britton Mujica Jr, MD office to schedule a nurse visit or can visit any one  of our pharmacy locations to receive your vaccinations. You can also receive your vaccination at your local pharmacy. If you receive your vaccination at your local pharmacy we ask  that you please provide your Britton Mujica Jr, MD office with this information to update your medical record.    If you have had any of the above done at another facility, please let us know by calling [phone]; so that we can update your record.  We will add these results to your chart if you fax them to,[phone].    Thanks,             Additional Information  If you have questions, you can email [email or web address] or call [phone]  to talk to our [facility] staff. Remember, [facility] is NOT to be used for urgent needs. For medical emergencies, dial 911.

## 2019-01-29 PROBLEM — E78.00 HYPERCHOLESTEROLEMIA: Status: ACTIVE | Noted: 2019-01-29

## 2019-01-30 ENCOUNTER — OFFICE VISIT (OUTPATIENT)
Dept: FAMILY MEDICINE | Facility: CLINIC | Age: 44
End: 2019-01-30
Attending: FAMILY MEDICINE
Payer: COMMERCIAL

## 2019-01-30 ENCOUNTER — LAB VISIT (OUTPATIENT)
Dept: LAB | Facility: HOSPITAL | Age: 44
End: 2019-01-30
Attending: FAMILY MEDICINE
Payer: COMMERCIAL

## 2019-01-30 VITALS
HEART RATE: 82 BPM | WEIGHT: 244 LBS | BODY MASS INDEX: 30.34 KG/M2 | DIASTOLIC BLOOD PRESSURE: 80 MMHG | OXYGEN SATURATION: 96 % | HEIGHT: 75 IN | SYSTOLIC BLOOD PRESSURE: 130 MMHG

## 2019-01-30 DIAGNOSIS — R68.82 DECREASED LIBIDO: ICD-10-CM

## 2019-01-30 DIAGNOSIS — Z91.89 FRAMINGHAM CARDIAC RISK <10% IN NEXT 10 YEARS: ICD-10-CM

## 2019-01-30 DIAGNOSIS — E78.00 HYPERCHOLESTEROLEMIA: ICD-10-CM

## 2019-01-30 DIAGNOSIS — R39.9 LOWER URINARY TRACT SYMPTOMS (LUTS): ICD-10-CM

## 2019-01-30 DIAGNOSIS — I10 ESSENTIAL HYPERTENSION: ICD-10-CM

## 2019-01-30 DIAGNOSIS — Z00.00 ROUTINE GENERAL MEDICAL EXAMINATION AT A HEALTH CARE FACILITY: Primary | ICD-10-CM

## 2019-01-30 DIAGNOSIS — G47.33 OSA ON CPAP: ICD-10-CM

## 2019-01-30 LAB
ALBUMIN SERPL BCP-MCNC: 4 G/DL
ALP SERPL-CCNC: 74 U/L
ALT SERPL W/O P-5'-P-CCNC: 22 U/L
ANION GAP SERPL CALC-SCNC: 9 MMOL/L
AST SERPL-CCNC: 16 U/L
BILIRUB SERPL-MCNC: 0.7 MG/DL
BUN SERPL-MCNC: 18 MG/DL
CALCIUM SERPL-MCNC: 9.7 MG/DL
CHLORIDE SERPL-SCNC: 104 MMOL/L
CHOLEST SERPL-MCNC: 241 MG/DL
CHOLEST/HDLC SERPL: 4.7 {RATIO}
CO2 SERPL-SCNC: 26 MMOL/L
COMPLEXED PSA SERPL-MCNC: 0.49 NG/ML
CREAT SERPL-MCNC: 1.3 MG/DL
EST. GFR  (AFRICAN AMERICAN): >60 ML/MIN/1.73 M^2
EST. GFR  (NON AFRICAN AMERICAN): >60 ML/MIN/1.73 M^2
GLUCOSE SERPL-MCNC: 100 MG/DL
HDLC SERPL-MCNC: 51 MG/DL
HDLC SERPL: 21.2 %
LDLC SERPL CALC-MCNC: 168.8 MG/DL
NONHDLC SERPL-MCNC: 190 MG/DL
POTASSIUM SERPL-SCNC: 4 MMOL/L
PROT SERPL-MCNC: 7.7 G/DL
SODIUM SERPL-SCNC: 139 MMOL/L
TESTOST SERPL-MCNC: 535 NG/DL
TRIGL SERPL-MCNC: 106 MG/DL

## 2019-01-30 PROCEDURE — 84153 ASSAY OF PSA TOTAL: CPT

## 2019-01-30 PROCEDURE — 99396 PR PREVENTIVE VISIT,EST,40-64: ICD-10-PCS | Mod: S$GLB,,, | Performed by: FAMILY MEDICINE

## 2019-01-30 PROCEDURE — 3079F PR MOST RECENT DIASTOLIC BLOOD PRESSURE 80-89 MM HG: ICD-10-PCS | Mod: CPTII,S$GLB,, | Performed by: FAMILY MEDICINE

## 2019-01-30 PROCEDURE — 3075F SYST BP GE 130 - 139MM HG: CPT | Mod: CPTII,S$GLB,, | Performed by: FAMILY MEDICINE

## 2019-01-30 PROCEDURE — 80053 COMPREHEN METABOLIC PANEL: CPT

## 2019-01-30 PROCEDURE — 99396 PREV VISIT EST AGE 40-64: CPT | Mod: S$GLB,,, | Performed by: FAMILY MEDICINE

## 2019-01-30 PROCEDURE — 84403 ASSAY OF TOTAL TESTOSTERONE: CPT

## 2019-01-30 PROCEDURE — 80061 LIPID PANEL: CPT

## 2019-01-30 PROCEDURE — 99999 PR PBB SHADOW E&M-EST. PATIENT-LVL III: CPT | Mod: PBBFAC,,, | Performed by: FAMILY MEDICINE

## 2019-01-30 PROCEDURE — 36415 COLL VENOUS BLD VENIPUNCTURE: CPT | Mod: PO

## 2019-01-30 PROCEDURE — 3079F DIAST BP 80-89 MM HG: CPT | Mod: CPTII,S$GLB,, | Performed by: FAMILY MEDICINE

## 2019-01-30 PROCEDURE — 99999 PR PBB SHADOW E&M-EST. PATIENT-LVL III: ICD-10-PCS | Mod: PBBFAC,,, | Performed by: FAMILY MEDICINE

## 2019-01-30 PROCEDURE — 3075F PR MOST RECENT SYSTOLIC BLOOD PRESS GE 130-139MM HG: ICD-10-PCS | Mod: CPTII,S$GLB,, | Performed by: FAMILY MEDICINE

## 2019-01-30 NOTE — PROGRESS NOTES
Subjective:       Patient ID: Tate Sanford is a 43 y.o. male.    Chief Complaint: Annual Exam    The patient presents to the office today requesting a routine periodic health examination.  This is his 1st visit in almost 18 months.  He has a history of hypertension and obstructive sleep apnea.  He had been prescribed auto CPAP in 2017.    Hypertension   This is a chronic problem. The current episode started more than 1 year ago. The problem is unchanged. The problem is controlled. Pertinent negatives include no anxiety, blurred vision, chest pain, headaches, malaise/fatigue, neck pain, orthopnea, palpitations, peripheral edema, PND, shortness of breath or sweats. Agents associated with hypertension include NSAIDs. Risk factors for coronary artery disease include stress. Past treatments include ACE inhibitors, diuretics and lifestyle changes. The current treatment provides significant improvement. Compliance problems include diet.        Patient Active Problem List   Diagnosis    Hypertension    [place] cardiac risk <10% in next 10 years    LOLITA on CPAP    Overweight (BMI 25.0-29.9)    Hypercholesterolemia       Past Surgical History:   Procedure Laterality Date    WRIST SURGERY Left 1990         Current Outpatient Medications:     lisinopril-hydrochlorothiazide (PRINZIDE,ZESTORETIC) 20-12.5 mg per tablet, TAKE 1 TABLET BY MOUTH EVERY DAY, Disp: 30 tablet, Rfl: 0    Review of patient's allergies indicates:  No Known Allergies    Family History   Problem Relation Age of Onset    No Known Problems Mother     Lymphoma Father     Heart disease Sister     Breast cancer Maternal Grandmother     No Known Problems Son        Social History     Socioeconomic History    Marital status:      Spouse name: Not on file    Number of children: 1    Years of education: Not on file    Highest education level: Not on file   Social Needs    Financial resource strain: Not hard at all    Food insecurity  - worry: Never true    Food insecurity - inability: Never true    Transportation needs - medical: No    Transportation needs - non-medical: No   Occupational History    Occupation: Psychiatrist     Comment: [employer]   Tobacco Use    Smoking status: Never Smoker   Substance and Sexual Activity    Alcohol use: Yes     Frequency: Monthly or less    Drug use: No    Sexual activity: Yes     Partners: Female   Other Topics Concern    Not on file   Social History Narrative    The patient does exercise regularly     He is not satisfied with weight.    Rates diet as fair to poor.    He does not drink at least 1/2 gallon water daily.    He drinks coffee/tea/caffeine-containing soft drinks daily.    Total sleep time at night is 7 hours.    He works 40 hours per week.    He does wear seat belts.    Hobbies include none.           Patient Care Team:  Britton Mujica Jr., MD as PCP - General (Family Medicine)  Corinna García LPN as Care Coordinator      Review of Systems   Constitutional: Negative for fatigue, malaise/fatigue and unexpected weight change.   HENT: Negative for ear discharge, ear pain, hearing loss, tinnitus and voice change.    Eyes: Negative for blurred vision and pain.   Respiratory: Negative for cough and shortness of breath.    Cardiovascular: Negative for chest pain, palpitations, orthopnea, leg swelling and PND.   Gastrointestinal: Negative for abdominal pain, blood in stool, constipation, diarrhea, nausea and vomiting.   Genitourinary: Positive for frequency and urgency. Negative for decreased urine volume, difficulty urinating, dysuria, enuresis and hematuria.   Musculoskeletal: Negative for arthralgias, back pain, myalgias and neck pain.   Skin: Negative for rash.   Neurological: Negative for dizziness, weakness, light-headedness and headaches.   Hematological: Does not bruise/bleed easily.   Psychiatric/Behavioral: Negative for dysphoric mood and sleep disturbance. The patient is not  "nervous/anxious.        Objective:      /80 (BP Location: Right arm, Patient Position: Sitting, BP Method: Small (Manual))   Pulse 82   Ht 6' 3" (1.905 m)   Wt 110.7 kg (244 lb)   SpO2 96%   BMI 30.50 kg/m²     Physical Exam   Constitutional: He is oriented to person, place, and time. He appears well-developed and well-nourished. He is cooperative.   HENT:   Head: Normocephalic and atraumatic.   Right Ear: External ear normal.   Left Ear: External ear normal.   Nose: Nose normal.   Mouth/Throat: Oropharynx is clear and moist and mucous membranes are normal. No oropharyngeal exudate.   Eyes: Conjunctivae are normal. No scleral icterus.   Neck: Neck supple. No JVD present. Carotid bruit is not present. No thyromegaly present.   Cardiovascular: Normal rate, regular rhythm, normal heart sounds and normal pulses. Exam reveals no gallop and no friction rub.   No murmur heard.  Pulmonary/Chest: Effort normal and breath sounds normal. He has no wheezes. He has no rhonchi. He has no rales.   Abdominal: Soft. Bowel sounds are normal. He exhibits no distension and no mass. There is no splenomegaly or hepatomegaly. There is no tenderness.   Musculoskeletal: Normal range of motion. He exhibits no edema or tenderness.   Lymphadenopathy:     He has no cervical adenopathy.     He has no axillary adenopathy.   Neurological: He is alert and oriented to person, place, and time. He has normal strength and normal reflexes. No cranial nerve deficit or sensory deficit. Coordination normal.   Skin: Skin is warm and dry.   Psychiatric: He has a normal mood and affect.   Vitals reviewed.        Assessment:       1. Routine general medical examination at a health care facility    2. Essential hypertension    3. Hypercholesterolemia    4. LOLITA on CPAP    5. [place] cardiac risk <10% in next 10 years    6. Lower urinary tract symptoms (LUTS)        Plan:       Labs (see orders).  Discussed with patient the importance of lifestyle " "modifications, including well-balanced diet and moderate exercise regimen, in reducing risk for cardiovascular/cerebrovascular disease and diabetes.  We will call the patient with results & make further recommendations at that time.      "This note will not be shared with the patient."  "

## 2019-01-31 ENCOUNTER — PATIENT MESSAGE (OUTPATIENT)
Dept: FAMILY MEDICINE | Facility: CLINIC | Age: 44
End: 2019-01-31

## 2019-01-31 DIAGNOSIS — Z91.89 FRAMINGHAM CARDIAC RISK <10% IN NEXT 10 YEARS: ICD-10-CM

## 2019-02-01 ENCOUNTER — PATIENT MESSAGE (OUTPATIENT)
Dept: FAMILY MEDICINE | Facility: CLINIC | Age: 44
End: 2019-02-01

## 2019-02-04 ENCOUNTER — PATIENT MESSAGE (OUTPATIENT)
Dept: FAMILY MEDICINE | Facility: CLINIC | Age: 44
End: 2019-02-04

## 2019-02-06 ENCOUNTER — PATIENT MESSAGE (OUTPATIENT)
Dept: FAMILY MEDICINE | Facility: CLINIC | Age: 44
End: 2019-02-06

## 2019-02-06 RX ORDER — LISINOPRIL AND HYDROCHLOROTHIAZIDE 12.5; 2 MG/1; MG/1
1 TABLET ORAL DAILY
Qty: 90 TABLET | Refills: 0 | Status: SHIPPED | OUTPATIENT
Start: 2019-02-06 | End: 2019-04-09 | Stop reason: SDUPTHER

## 2019-04-09 RX ORDER — LISINOPRIL AND HYDROCHLOROTHIAZIDE 12.5; 2 MG/1; MG/1
1 TABLET ORAL DAILY
Qty: 90 TABLET | Refills: 0 | Status: SHIPPED | OUTPATIENT
Start: 2019-04-09 | End: 2019-06-03 | Stop reason: SDUPTHER

## 2019-04-16 ENCOUNTER — TELEPHONE (OUTPATIENT)
Dept: SLEEP MEDICINE | Facility: CLINIC | Age: 44
End: 2019-04-16

## 2019-06-03 RX ORDER — LISINOPRIL AND HYDROCHLOROTHIAZIDE 12.5; 2 MG/1; MG/1
1 TABLET ORAL DAILY
Qty: 90 TABLET | Refills: 1 | Status: SHIPPED | OUTPATIENT
Start: 2019-06-03 | End: 2019-11-07 | Stop reason: SDUPTHER

## 2019-08-20 ENCOUNTER — PATIENT OUTREACH (OUTPATIENT)
Dept: ADMINISTRATIVE | Facility: HOSPITAL | Age: 44
End: 2019-08-20

## 2019-11-08 RX ORDER — LISINOPRIL AND HYDROCHLOROTHIAZIDE 12.5; 2 MG/1; MG/1
1 TABLET ORAL DAILY
Qty: 30 TABLET | Refills: 0 | Status: SHIPPED | OUTPATIENT
Start: 2019-11-08 | End: 2020-01-24 | Stop reason: SDUPTHER

## 2019-12-11 ENCOUNTER — TELEPHONE (OUTPATIENT)
Dept: SLEEP MEDICINE | Facility: CLINIC | Age: 44
End: 2019-12-11

## 2019-12-14 ENCOUNTER — OFFICE VISIT (OUTPATIENT)
Dept: FAMILY MEDICINE | Facility: CLINIC | Age: 44
End: 2019-12-14
Attending: FAMILY MEDICINE
Payer: COMMERCIAL

## 2019-12-14 VITALS — WEIGHT: 227 LBS | BODY MASS INDEX: 28.23 KG/M2 | HEIGHT: 75 IN

## 2019-12-14 DIAGNOSIS — G47.33 OSA ON CPAP: ICD-10-CM

## 2019-12-14 DIAGNOSIS — I10 ESSENTIAL HYPERTENSION: Primary | ICD-10-CM

## 2019-12-14 DIAGNOSIS — Z91.89 FRAMINGHAM CARDIAC RISK <10% IN NEXT 10 YEARS: ICD-10-CM

## 2019-12-14 PROCEDURE — 99214 PR OFFICE/OUTPT VISIT, EST, LEVL IV, 30-39 MIN: ICD-10-PCS | Mod: S$GLB,,, | Performed by: FAMILY MEDICINE

## 2019-12-14 PROCEDURE — 99999 PR PBB SHADOW E&M-EST. PATIENT-LVL III: CPT | Mod: PBBFAC,,, | Performed by: FAMILY MEDICINE

## 2019-12-14 PROCEDURE — 99214 OFFICE O/P EST MOD 30 MIN: CPT | Mod: S$GLB,,, | Performed by: FAMILY MEDICINE

## 2019-12-14 PROCEDURE — 99999 PR PBB SHADOW E&M-EST. PATIENT-LVL III: ICD-10-PCS | Mod: PBBFAC,,, | Performed by: FAMILY MEDICINE

## 2019-12-14 NOTE — PROGRESS NOTES
Subjective:       Patient ID: Tate Sanford is a 44 y.o. male.    Chief Complaint: Hypertension    Hypertension   This is a chronic problem. The problem is controlled. Associated symptoms include neck pain. Pertinent negatives include no chest pain, headaches, palpitations or shortness of breath. Risk factors for coronary artery disease include dyslipidemia. Past treatments include diuretics and ACE inhibitors. The current treatment provides significant improvement. There is no history of angina, kidney disease, CAD/MI, CVA or heart failure.        Patient Active Problem List   Diagnosis    Hypertension    [place] cardiac risk <10% in next 10 years    LOLITA on CPAP    Overweight (BMI 25.0-29.9)    Elevated serum cholesterol       Current Outpatient Medications:     lisinopril-hydrochlorothiazide (PRINZIDE,ZESTORETIC) 20-12.5 mg per tablet, TAKE 1 TABLET BY MOUTH ONCE DAILY, Disp: 30 tablet, Rfl: 0    The following portions of the patient's history were reviewed and updated as appropriate: allergies, past family history, past medical history, past social history and past surgical history.    Review of Systems   Constitutional: Negative for fatigue and unexpected weight change.   HENT: Negative for ear discharge, ear pain, hearing loss, tinnitus and voice change.    Eyes: Negative for pain.   Respiratory: Negative for cough and shortness of breath.    Cardiovascular: Negative for chest pain, palpitations and leg swelling.   Gastrointestinal: Negative for abdominal pain, blood in stool, constipation, diarrhea, nausea and vomiting.   Genitourinary: Negative for decreased urine volume, difficulty urinating, dysuria, enuresis, frequency, hematuria and urgency.   Musculoskeletal: Positive for neck pain. Negative for arthralgias, back pain and myalgias.   Skin: Negative for rash.   Neurological: Negative for dizziness, weakness, light-headedness and headaches.   Hematological: Does not bruise/bleed easily.  "  Psychiatric/Behavioral: Negative for dysphoric mood and sleep disturbance. The patient is not nervous/anxious.          Objective:      BP (P) 120/80   Pulse (P) 70   Ht 6' 3" (1.905 m)   Wt 103 kg (227 lb)   SpO2 (P) 98%   BMI 28.37 kg/m²     Physical Exam   Constitutional: He is oriented to person, place, and time. He appears well-developed and well-nourished. He is cooperative.   HENT:   Head: Normocephalic and atraumatic.   Right Ear: External ear normal.   Left Ear: External ear normal.   Nose: Nose normal.   Mouth/Throat: Oropharynx is clear and moist and mucous membranes are normal. No oropharyngeal exudate.   Eyes: Conjunctivae are normal. No scleral icterus.   Neck: Neck supple. No JVD present. Carotid bruit is not present. No thyromegaly present.   Cardiovascular: Normal rate, regular rhythm, normal heart sounds and normal pulses. Exam reveals no gallop and no friction rub.   No murmur heard.  Pulmonary/Chest: Effort normal and breath sounds normal. He has no wheezes. He has no rhonchi. He has no rales.   Abdominal: Soft. Bowel sounds are normal. He exhibits no distension and no mass. There is no splenomegaly or hepatomegaly. There is no tenderness.   Musculoskeletal: Normal range of motion. He exhibits no edema or tenderness.   Lymphadenopathy:     He has no cervical adenopathy.     He has no axillary adenopathy.   Neurological: He is alert and oriented to person, place, and time. He has normal strength and normal reflexes. No cranial nerve deficit or sensory deficit. Coordination normal.   Skin: Skin is warm and dry.   Psychiatric: He has a normal mood and affect.   Vitals reviewed.      Assessment:       1. Essential hypertension    2. LOLITA on CPAP    3. [place] cardiac risk <10% in next 10 years          Plan:       Continue present medications.  RTC 6 months.    "This note will not be shared with the patient."  "

## 2020-01-24 ENCOUNTER — PATIENT MESSAGE (OUTPATIENT)
Dept: ADMINISTRATIVE | Facility: OTHER | Age: 45
End: 2020-01-24

## 2020-01-24 DIAGNOSIS — I10 ESSENTIAL HYPERTENSION: Primary | ICD-10-CM

## 2020-01-24 DIAGNOSIS — E78.00 ELEVATED SERUM CHOLESTEROL: ICD-10-CM

## 2020-01-24 RX ORDER — LISINOPRIL AND HYDROCHLOROTHIAZIDE 12.5; 2 MG/1; MG/1
1 TABLET ORAL DAILY
Qty: 90 TABLET | Refills: 1 | Status: SHIPPED | OUTPATIENT
Start: 2020-01-24 | End: 2020-01-26

## 2020-01-26 RX ORDER — LISINOPRIL AND HYDROCHLOROTHIAZIDE 12.5; 2 MG/1; MG/1
1 TABLET ORAL DAILY
Qty: 90 TABLET | Refills: 1 | Status: SHIPPED | OUTPATIENT
Start: 2020-01-26 | End: 2020-07-05

## 2020-01-26 RX ORDER — LISINOPRIL AND HYDROCHLOROTHIAZIDE 12.5; 2 MG/1; MG/1
1 TABLET ORAL DAILY
Qty: 30 TABLET | Refills: 0 | Status: SHIPPED | OUTPATIENT
Start: 2020-01-26 | End: 2020-01-26 | Stop reason: CLARIF

## 2020-01-26 NOTE — TELEPHONE ENCOUNTER
Patient just seen in December, 2019.  Unfortunately, I forgot to order some laboratory testing.  Please contact patient for lab appointment.  See orders.

## 2020-01-27 NOTE — TELEPHONE ENCOUNTER
karlym for pt to inform him that dr approved his refill request for Lisinopl/HCTZ, but dr also wants him to do some bw he forgot to add when pt was last seen in 12/19.

## 2020-01-31 ENCOUNTER — LAB VISIT (OUTPATIENT)
Dept: LAB | Facility: HOSPITAL | Age: 45
End: 2020-01-31
Attending: FAMILY MEDICINE
Payer: COMMERCIAL

## 2020-01-31 DIAGNOSIS — I10 ESSENTIAL HYPERTENSION: ICD-10-CM

## 2020-01-31 DIAGNOSIS — E78.00 ELEVATED SERUM CHOLESTEROL: ICD-10-CM

## 2020-01-31 LAB
ALBUMIN SERPL BCP-MCNC: 4 G/DL (ref 3.5–5.2)
ALP SERPL-CCNC: 74 U/L (ref 55–135)
ALT SERPL W/O P-5'-P-CCNC: 21 U/L (ref 10–44)
ANION GAP SERPL CALC-SCNC: 9 MMOL/L (ref 8–16)
AST SERPL-CCNC: 19 U/L (ref 10–40)
BILIRUB SERPL-MCNC: 0.7 MG/DL (ref 0.1–1)
BUN SERPL-MCNC: 22 MG/DL (ref 6–20)
CALCIUM SERPL-MCNC: 9.5 MG/DL (ref 8.7–10.5)
CHLORIDE SERPL-SCNC: 104 MMOL/L (ref 95–110)
CHOLEST SERPL-MCNC: 231 MG/DL (ref 120–199)
CHOLEST/HDLC SERPL: 4.6 {RATIO} (ref 2–5)
CO2 SERPL-SCNC: 28 MMOL/L (ref 23–29)
CREAT SERPL-MCNC: 1.4 MG/DL (ref 0.5–1.4)
EST. GFR  (AFRICAN AMERICAN): >60 ML/MIN/1.73 M^2
EST. GFR  (NON AFRICAN AMERICAN): >60 ML/MIN/1.73 M^2
GLUCOSE SERPL-MCNC: 101 MG/DL (ref 70–110)
HDLC SERPL-MCNC: 50 MG/DL (ref 40–75)
HDLC SERPL: 21.6 % (ref 20–50)
LDLC SERPL CALC-MCNC: 166.6 MG/DL (ref 63–159)
NONHDLC SERPL-MCNC: 181 MG/DL
POTASSIUM SERPL-SCNC: 4.3 MMOL/L (ref 3.5–5.1)
PROT SERPL-MCNC: 7.4 G/DL (ref 6–8.4)
SODIUM SERPL-SCNC: 141 MMOL/L (ref 136–145)
TRIGL SERPL-MCNC: 72 MG/DL (ref 30–150)

## 2020-01-31 PROCEDURE — 80053 COMPREHEN METABOLIC PANEL: CPT

## 2020-01-31 PROCEDURE — 36415 COLL VENOUS BLD VENIPUNCTURE: CPT | Mod: PO

## 2020-01-31 PROCEDURE — 80061 LIPID PANEL: CPT

## 2020-02-03 ENCOUNTER — PATIENT MESSAGE (OUTPATIENT)
Dept: FAMILY MEDICINE | Facility: CLINIC | Age: 45
End: 2020-02-03

## 2020-02-03 DIAGNOSIS — Z91.89 FRAMINGHAM CARDIAC RISK <10% IN NEXT 10 YEARS: ICD-10-CM

## 2020-02-04 ENCOUNTER — PATIENT MESSAGE (OUTPATIENT)
Dept: FAMILY MEDICINE | Facility: CLINIC | Age: 45
End: 2020-02-04

## 2020-02-12 ENCOUNTER — PATIENT OUTREACH (OUTPATIENT)
Dept: OTHER | Facility: OTHER | Age: 45
End: 2020-02-12

## 2020-02-12 ENCOUNTER — PATIENT MESSAGE (OUTPATIENT)
Dept: SLEEP MEDICINE | Facility: CLINIC | Age: 45
End: 2020-02-12

## 2020-02-12 NOTE — LETTER
February 17, 2020     Tate Sanford  [address]  [facility] [address]       Dear Tate,    Welcome to [facility] Our goal is to make care effective, proactive and convenient by using data you send us from home to better treat your chronic conditions.              My name is Fernie BernabeMelvinaAnthony, and I am your dedicated Digital Medicine clinician. As an expert in medication management, I will help ensure that the medications you are taking continue to provide the intended benefits and help you reach your goals. You can reach me directly at [phone] or by sending me a message directly through your [facility] account.      I am Jaqui Ritter and I will be your health . My job is to help you identify lifestyle changes to improve your disease control. We will talk about nutrition, exercise, and other ways you may be able to adjust your current habits to better your health. Additionally, we will help ensure you are completing the tests and screenings that are necessary to help manage your conditions. You can reach me directly at [phone] or by sending me a message directly through your [facility] account.    Most importantly, YOU are at the center of this team. Together, we will work to improve your overall health and encourage you to meet your goals for a healthier lifestyle.     What we expect from YOU:  · Please take frequent home blood pressure measurements. We ask that you take at least 1 blood pressure reading per week, but more information will better help us get you know you. Be sure you rest for a few minutes before taking the reading in a quiet, comfortable place.     Be available to receive phone calls or [employer] messages, when appropriate, from your care team. Please let us know if there are any specific days or times that work best for us to reach you via phone.     Complete routine tests and screenings. Dont worry, we will help keep you on  track!           What you should expect from your Digital Medicine Care Team:   We will work with you to create a personalized plan of care and provide you with encouragement and education, including regarding lifestyle changes, that could help you manage your disease states.     We will adjust your current medications, if needed, and continue to monitor your long-term progress.     We will provide you and your physician with monthly progress reports after you have been in the program for more than 30 days.     We will send you reminders through [employer] and text messages to help ensure you do not miss any testing deadlines to help manage your disease states.    You will be able to reach us by phone or through your [employer] account by clicking our names under Care Team on the right side of the home screen.    I look forward to working with you to achieve your blood pressure goals!    We look forward to working with you to help manage your health,    Sincerely,    Your Digital Medicine Team    Please visit our websites to learn more:   · Hypertension: [email or web address]      Remember, we are not available for emergencies. If you have an emergency, please contact your doctors office directly or call [facility] [place] on-call ([phone] or [phone]) or [phone].

## 2020-02-12 NOTE — LETTER
February 17, 2020     Tate Sanford  [address]  [facility] [address]       Dear Tate,    Welcome to [facility]! Our goal is to make care effective, proactive and convenient by using data you send us from home to better treat your chronic conditions.              My name is Fernie BernabeMelvinaAnthony, and I am your dedicated Digital Medicine clinician. As an expert in medication management, I will help ensure that the medications you are taking continue to provide the intended benefits and help you reach your goals. You can reach me directly at [phone] or by sending me a message directly through your [facility] account.      I am Jaqui Ritter and I will be your health . My job is to help you identify lifestyle changes to improve your disease control. We will talk about nutrition, exercise, and other ways you may be able to adjust your current habits to better your health. Additionally, we will help ensure you are completing the tests and screenings that are necessary to help manage your conditions. You can reach me directly at [phone] or by sending me a message directly through your [facility] account.    Most importantly, YOU are at the center of this team. Together, we will work to improve your overall health and encourage you to meet your goals for a healthier lifestyle.     What we expect from YOU:  · Please take frequent home blood pressure measurements. We ask that you take at least 1 blood pressure reading per week, but more information will better help us get you know you. Be sure you rest for a few minutes before taking the reading in a quiet, comfortable place.     Be available to receive phone calls or [employer] messages, when appropriate, from your care team. Please let us know if there are any specific days or times that work best for us to reach you via phone.     Complete routine tests and screenings. Dont worry, we will help keep you on  track!           What you should expect from your Digital Medicine Care Team:   We will work with you to create a personalized plan of care and provide you with encouragement and education, including regarding lifestyle changes, that could help you manage your disease states.     We will adjust your current medications, if needed, and continue to monitor your long-term progress.     We will provide you and your physician with monthly progress reports after you have been in the program for more than 30 days.     We will send you reminders through [employer] and text messages to help ensure you do not miss any testing deadlines to help manage your disease states.    You will be able to reach us by phone or through your [employer] account by clicking our names under Care Team on the right side of the home screen.    I look forward to working with you to achieve your blood pressure goals!    We look forward to working with you to help manage your health,    Sincerely,    Your Digital Medicine Team    Please visit our websites to learn more:   · Hypertension: [email or web address]      Remember, we are not available for emergencies. If you have an emergency, please contact your doctors office directly or call [facility] on-call ([phone] or [phone]) or [phone].

## 2020-02-17 ENCOUNTER — OFFICE VISIT (OUTPATIENT)
Dept: SLEEP MEDICINE | Facility: CLINIC | Age: 45
End: 2020-02-17
Payer: COMMERCIAL

## 2020-02-17 ENCOUNTER — PATIENT OUTREACH (OUTPATIENT)
Dept: OTHER | Facility: OTHER | Age: 45
End: 2020-02-17

## 2020-02-17 VITALS
HEART RATE: 85 BPM | HEIGHT: 75 IN | DIASTOLIC BLOOD PRESSURE: 80 MMHG | SYSTOLIC BLOOD PRESSURE: 124 MMHG | WEIGHT: 231.94 LBS | BODY MASS INDEX: 28.84 KG/M2

## 2020-02-17 DIAGNOSIS — G47.33 OSA ON CPAP: ICD-10-CM

## 2020-02-17 PROCEDURE — 99999 PR PBB SHADOW E&M-EST. PATIENT-LVL III: CPT | Mod: PBBFAC,,, | Performed by: NURSE PRACTITIONER

## 2020-02-17 PROCEDURE — 3074F SYST BP LT 130 MM HG: CPT | Mod: CPTII,S$GLB,, | Performed by: NURSE PRACTITIONER

## 2020-02-17 PROCEDURE — 3079F PR MOST RECENT DIASTOLIC BLOOD PRESSURE 80-89 MM HG: ICD-10-PCS | Mod: CPTII,S$GLB,, | Performed by: NURSE PRACTITIONER

## 2020-02-17 PROCEDURE — 3079F DIAST BP 80-89 MM HG: CPT | Mod: CPTII,S$GLB,, | Performed by: NURSE PRACTITIONER

## 2020-02-17 PROCEDURE — 3008F BODY MASS INDEX DOCD: CPT | Mod: CPTII,S$GLB,, | Performed by: NURSE PRACTITIONER

## 2020-02-17 PROCEDURE — 3008F PR BODY MASS INDEX (BMI) DOCUMENTED: ICD-10-PCS | Mod: CPTII,S$GLB,, | Performed by: NURSE PRACTITIONER

## 2020-02-17 PROCEDURE — 99999 PR PBB SHADOW E&M-EST. PATIENT-LVL III: ICD-10-PCS | Mod: PBBFAC,,, | Performed by: NURSE PRACTITIONER

## 2020-02-17 PROCEDURE — 99214 OFFICE O/P EST MOD 30 MIN: CPT | Mod: S$GLB,,, | Performed by: NURSE PRACTITIONER

## 2020-02-17 PROCEDURE — 99214 PR OFFICE/OUTPT VISIT, EST, LEVL IV, 30-39 MIN: ICD-10-PCS | Mod: S$GLB,,, | Performed by: NURSE PRACTITIONER

## 2020-02-17 PROCEDURE — 3074F PR MOST RECENT SYSTOLIC BLOOD PRESSURE < 130 MM HG: ICD-10-PCS | Mod: CPTII,S$GLB,, | Performed by: NURSE PRACTITIONER

## 2020-02-17 NOTE — PROGRESS NOTES
"Tate Sanford 44 y.o. year-old male returns for management of obstructive sleep apnea and CPAP equipment check.     Here for annual visit. Last seen November 2017. Continues to use CPA without breakthrough symptoms. Denies oral/nasal drying, nasal congestion, pressure intolerance, or aerophagia.   Nocturia 2 - 3 times, not difficult to fall back asleep; takes lisinopril/HCTZ at night because AM dosing causes q 30 min bathroom breaks which affects work flow significantly.   Now works for [employer] (was at [employer]).     APAP 4 - 12 cm, > 4 hours: 83.3%, Predicted AHI: 2.5, 90%: 5.5 cm     On today's Al[place]o Sleepiness Scale the patient scores a 5.    Baseline Sleep Study: 09/21/2017 HST via [facility] AHI 5.2 with oxygen francisco 87%    Review of Systems: Sleep related symptoms as per HPI. Otherwise, a balance of 10 systems reviewed is negative.    Physical Exam:   /80   Pulse 85   Ht 6' 3" (1.905 m)   Wt 105.2 kg (231 lb 14.8 oz)   BMI 28.99 kg/m²   GENERAL: Well groomed  Wt 231 lb.  (prior 233 lb)    Assessment:     Obstructive sleep apnea, mild by AHI. The patient symptomatically has snoring, excessive daytime sleepiness, witnessed breathing apnea, and morning headaches now resolved with CPAP use. The patient is adherent on CPAP and experiencing symptomatic benefit.  Medical co-morbidities: essential hypertension and overweight BMI.  This warrants treatment for LOLITA.     Nocturia, medication effect from nighttime dosing diuretic; pt understands this and satisfied with quality of sleep despite nocturia      Overweight BMI, discussed relationship between LOLITA and weight     PLAN:     Treatment  -continue auto CPAP; pressure adjusted from 6 - 20 cm to 4 - 12 cm  -Counseling regarding benefits of healthy eating and physical activity (150 minutes of moderate-intensity aerobic activity per week) for weight reduction which can improve overall health.   -RTC 12 months, sooner if needed. "      Education: During our discussion today, we talked about the etiology of obstructive sleep apnea as well as the potential ramifications of untreated sleep apnea, which could include daytime sleepiness, hypertension, heart disease and/or stroke. We discussed potential treatment options, which could include weight loss, body positioning, continuous positive airway pressure (CPAP), or referral for surgical consideration.      Precautions: The patient was advised to abstain from driving should they feel sleepy  or drowsy.

## 2020-02-17 NOTE — PROGRESS NOTES
Digital Medicine Program Enrollment      Our goal is to get BP to consistently below 130/80mmHg and make the process convenient so patient can avoid extra trips to the office. Getting your blood pressure below 130/80mmHg (definition of control) will reduce your risk for heart attack, kidney failure, stroke and death (as well as kidney failure, eye disease, & dementia)      Reviewed that the Digital Medicine care team - consisting of a clinician and a health  - will follow the most current evidence-based national guidelines for treating your condition.  The health  will focus on lifestyle modifications and motivation while the clinician will focus on medication therapy.  The care team will review all data on a regular basis and reach out as needed.      Explained that one of the key parts of the program is communication with the care team.  Asked patient to respond to outreach attempts and complete questionnaires.  Stressed importance of medication adherence.      Explained that we expect patient to obtain several blood pressures per week at random times of day.  Instructed patient not to allow anyone else to use phone and monitoring device.  Confirmed appropriate BP monitoring technique.      Explained to patient that the digital medicine team is not available for emergencies.  Patient will call [facility] on-call ([phone] or [phone]) or [phone] if needed.

## 2020-02-17 NOTE — PROGRESS NOTES
Digital Medicine: Clinician Introduction    Tate Sanford is a 44 y.o. male who is newly enrolled in the Digital Medicine Clinic.    The following information was reviewed and updated:  Preferred pharmacy   [facility] - [address]  [address] [facility]  [address]  [facility] [address]  Phone: [phone] Fax: [phone]    [id number] - [place], [place] - [address]  [address]  [place] [address]  Phone: [phone] Fax: [phone]      Patient prefers a 90 days supply.     Review of patient's allergies indicates:  No Known Allergies    Called patient to follow up. Patient endorses adherence to medication regimen. Patient denies hypotensive s/sx (lightheadedness, dizziness, nausea, fatigue); patient denies hypertensive s/sx (SOB, CP, severe headaches, changes in vision). Instructed patient to seek medical care if BP > 180/110 and is accompanied by hypertensive s/sx associated, patient confirms understanding.     Patient has been increasing his exercise, and is now weight training 4 days a week for 30 min to 1.5hrs per workout. He admits to not having a consistent diet because of traveling 3 days a week, but reports eating the following: seafood (shrimp, scallops, fish, etc), some beef and pasta, some vegetables but trying to increase intake. He states he eats fast food a few times a week but this is because he does not have a stove when he travels. He does pan sear or steam most of his foods.     Of note, patient has not had his readings automatically uploading into the chart. I notified him to do a sample reading today, and if it did not upload by end of business I would notify tech support to assist him.     Patient denies having questions or concerns. Patient has my contact information and knows to call with any concerns or clinical changes.        The history is provided by the patient. No  was used.      HYPERTENSION  Our goal is to get BP to consistently below 130/80mmHg and make the process convenient so patient can avoid extra trips to the office. Getting your blood pressure below 130/80mmHg (definition of control) will reduce your risk for heart attack, kidney failure, stroke and death (as well as kidney failure, eye disease, & dementia)      Reviewed non-pharmacologic therapies and impact on BP      Explained that we expect patient to obtain several blood pressures per week at random times of day.  Instructed patient not to allow anyone else to use phone and monitoring device.  Confirmed appropriate BP monitoring technique.      Explained to patient that the digital medicine team is not available for emergencies.  Patient will call [facility] on-call ([phone] or [phone]) or [phone] if needed.    Patient's BP goal is 130/80. Patients BP average is 120/85 mmHg, which is above goal, per 2017 ACC/AHA Hypertension Guidelines.    Allergies reviewed.      Last 5 Patient Entered Readings                                      Current 30 Day Average: 120/85     Recent Readings 2/13/2020 2/12/2020 2/12/2020 2/11/2020    SBP (mmHg) 129 110 128 121    DBP (mmHg) 87 80 87 86    Pulse 83 88 95 94            INTERVENTION(S)  reviewed appropriate dose schedule, recommended diet modifications, reviewed monitoring technique, encouragement/support and goal setting    PLAN  patient verbalizes understanding, patient amenable to changes, additional monitoring needed and Health  follow up    Assessment:  Reviewed recent readings. Per 2017 ACC/ AHA HTN guidelines (goal of BP < 130/80), current 30-day average is well controlled. Patient is near goal with slightly elevated SBP, focus on lifestyle at this time.    Plan:  Continue current medication regimen. I will continue to monitor regularly and will follow-up in 6-8 weeks, sooner if blood pressure begins to trend upward or downward.       There are no preventive care  reminders to display for this patient.    Current Medication Regimen:  Hypertension Medications             lisinopril-hydrochlorothiazide (PRINZIDE,ZESTORETIC) 20-12.5 mg per tablet Take 1 tablet by mouth once daily.            Reviewed the importance of self-monitoring, medication adherence, and that the health  can be used as a resource for lifestyle modifications to help reduce or maintain a healthy lifestyle.    Sent link to [facility] webpages and my contact information via [employer] for future questions. Follow up scheduled.                     Medication Adherence Screening   He did not miss a dose this month.

## 2020-03-06 ENCOUNTER — PATIENT OUTREACH (OUTPATIENT)
Dept: OTHER | Facility: OTHER | Age: 45
End: 2020-03-06

## 2020-03-06 NOTE — PROGRESS NOTES
"Digital Medicine: Health  Follow-Up    The history is provided by the patient.     Follow Up  Follow-up reason(s): reading review and routine education      Routine Education Topics: weight management, eating patterns and physical activity  Reviewed pt's readings. Mr. Sanford reports reading of 153/102 on 2/13 was without blood pressure medication. Pt wanted to see what blood pressure would run without medication. Pt reports he believes reading of 140/85 on 2/17 was incorrect because he did not give himself enough time to relax. He retested blood pressure after resting 5 minutes, and reading was 114/85. Encouraged pt to continue relaxing before taking blood pressure. Pt states he does not watch his salt intake. Discussed foods high in sodium and its relation to blood pressure. Encouraged pt to begin decreasing portion sizes. Pt states he "probably needs to work on weight loss". Discussed healthy eating. Encouraged whole grains, fruit/vegetables (and benefits of consuming), low fat dairy, lean protein, and healthy fats. Pt states he lifts weights in his garage gym 4xs per week for ~1 hour. Gave praise. Gave encouragement and support. Encouraged pt to reach out to me with any questions or concerns.       INTERVENTION(S)  recommended diet modifications, recommend physical activity, reviewed monitoring technique, encouragement/support and goal setting    PLAN  patient verbalizes understanding, patient amenable to changes and continue monitoring      There are no preventive care reminders to display for this patient.    Last 5 Patient Entered Readings                                      Current 30 Day Average: 118/86     Recent Readings 3/5/2020 3/4/2020 3/2/2020 2/17/2020 2/17/2020    SBP (mmHg) 123 127 109 114 140    DBP (mmHg) 88 94 79 85 85    Pulse 73 85 99 81 88                      Diet Screening   He has the following dietary restrictions: weight-loss and low sodium diet    Intervention(s): portion " control, low sodium diet education and reducing sodium intake    Physical Activity Screening   When asked if exercising, patient responded: yes    Patient participates in the following activities: weights    Pt states he lifts weights in his garage gym 4xs per week for ~1 hour.    Intervention(s): goal setting and goal tracking       SDOH

## 2020-03-30 ENCOUNTER — PATIENT OUTREACH (OUTPATIENT)
Dept: OTHER | Facility: OTHER | Age: 45
End: 2020-03-30

## 2020-04-17 ENCOUNTER — PATIENT OUTREACH (OUTPATIENT)
Dept: OTHER | Facility: OTHER | Age: 45
End: 2020-04-17

## 2020-04-17 NOTE — PROGRESS NOTES
Digital Medicine: Health  Follow-Up    The history is provided by the patient.     Follow Up  Follow-up reason(s): reading review and routine education      Routine Education Topics: eating patterns and physical activity  Called pt for follow up. Pt states he is doing well and is very pleased with readings. Average BP is 116/80 and at goal. He admits limting sodium is difficult, and has not changed his diet much. Although pt does state he has cut out soda for 3-4 weeks now. Gave praise and discussed benefits. He is also preparing his own foods for the most part. Pt states he has increased his physical activity lately. He weight lifts 6-7 days a week and walks ~10,000 steps daily. Gave encouragement and support for pt to continue with progress. He denied questions for me at this time.       INTERVENTION(S)  recommended diet modifications, recommend physical activity and encouragement/support    PLAN  patient verbalizes understanding, patient amenable to changes and continue monitoring      There are no preventive care reminders to display for this patient.    Last 5 Patient Entered Readings                                      Current 30 Day Average: 116/80     Recent Readings 4/17/2020 4/16/2020 4/15/2020 4/13/2020 4/13/2020    SBP (mmHg) 118 119 126 112 121    DBP (mmHg) 88 75 78 76 79    Pulse 89 70 92 68 65                      Diet Screening   He has the following dietary restrictions: low sodium diet    Intervention(s): reducing sodium intake    Physical Activity Screening   When asked if exercising, patient responded: yes    Pt states he has increased his physical activity lately. He weight lifts 6-7 days a week and walks ~10,000 steps daily.     Intervention(s): goal setting and goal tracking       SDOH

## 2020-04-29 NOTE — PROGRESS NOTES
Digital Medicine: Clinician Follow-Up    Called patient to follow up. Patient endorses adherence to medication regimen. Patient denies hypertensive s/sx (SOB, CP, severe headaches, changes in vision).     Patient is feeling fine, had a fleeting episode of orthostatic hypotension but patient states this is not concerning. I encouraged him to contact me if this increases in frequency or intensity, patient verbally agreed.       Patient denies having questions or concerns. Patient has my contact information and knows to call with any concerns or clinical changes.        The history is provided by the patient. No  was used.     Follow Up  Follow-up reason(s): reading review      Readings are trending down     Assessment:  Reviewed recent readings. Per 2017 ACC/ AHA HTN guidelines (goal of BP < 130/80), current 30-day average is well controlled.       INTERVENTION(S)  reviewed appropriate dose schedule, encouragement/support and goal setting    PLAN  patient verbalizes understanding and continue monitoring    Continue current medication regimen. I will continue to monitor regularly and will follow-up in 4-6 months, sooner if blood pressure begins to trend upward or downward.         There are no preventive care reminders to display for this patient.    Last 5 Patient Entered Readings                                      Current 30 Day Average: 118/79     Recent Readings 4/28/2020 4/27/2020 4/27/2020 4/26/2020 4/25/2020    SBP (mmHg) 108 124 108 104 126    DBP (mmHg) 71 87 83 62 82    Pulse 83 83 71 56 63             Hypertension Medications             lisinopril-hydrochlorothiazide (PRINZIDE,ZESTORETIC) 20-12.5 mg per tablet Take 1 tablet by mouth once daily.                 Screenings

## 2020-07-05 RX ORDER — LISINOPRIL AND HYDROCHLOROTHIAZIDE 12.5; 2 MG/1; MG/1
1 TABLET ORAL DAILY
Qty: 90 TABLET | Refills: 1 | Status: SHIPPED | OUTPATIENT
Start: 2020-07-05 | End: 2020-12-21

## 2020-07-10 ENCOUNTER — PATIENT OUTREACH (OUTPATIENT)
Dept: OTHER | Facility: OTHER | Age: 45
End: 2020-07-10

## 2020-07-10 NOTE — PROGRESS NOTES
"Digital Medicine: Health  Follow-Up    The history is provided by the patient.   Follow Up  Follow-up reason(s): reading review and routine education      Routine Education Topics: eating patterns and physical activity  Called pt for follow up. Average /83. Pt reports he is "still on the same page as last time". No changes to diet or physical activity. Pt reports he is still walking ~10,000 steps daily. Gave praise for at goal readings. Denies questions or concerns today.      INTERVENTION(S)  recommended diet modifications, recommend physical activity, encouragement/support and denied questions    PLAN  patient verbalizes understanding, patient amenable to changes and continue monitoring      There are no preventive care reminders to display for this patient.    Last 5 Patient Entered Readings                                      Current 30 Day Average: 118/83     Recent Readings 6/30/2020 6/29/2020 6/29/2020 6/28/2020 6/28/2020    SBP (mmHg) 115 107 127 117 111    DBP (mmHg) 77 73 92 84 81    Pulse 70 74 81 57 59                      Diet Screening   No change to diet.  He has the following dietary restrictions: low sodium diet    Physical Activity Screening   No change to exercise routine.    When asked if exercising, patient responded: yes    Patient participates in the following activities: walking      SDOH  "

## 2020-07-27 ENCOUNTER — LAB VISIT (OUTPATIENT)
Dept: PRIMARY CARE CLINIC | Facility: OTHER | Age: 45
End: 2020-07-27
Attending: INTERNAL MEDICINE
Payer: COMMERCIAL

## 2020-07-27 DIAGNOSIS — Z11.59 SCREENING FOR VIRAL DISEASE: ICD-10-CM

## 2020-07-27 PROCEDURE — U0003 INFECTIOUS AGENT DETECTION BY NUCLEIC ACID (DNA OR RNA); SEVERE ACUTE RESPIRATORY SYNDROME CORONAVIRUS 2 (SARS-COV-2) (CORONAVIRUS DISEASE [COVID-19]), AMPLIFIED PROBE TECHNIQUE, MAKING USE OF HIGH THROUGHPUT TECHNOLOGIES AS DESCRIBED BY CMS-2020-01-R: HCPCS

## 2020-07-31 LAB — SARS-COV-2 RNA RESP QL NAA+PROBE: NEGATIVE

## 2020-10-08 ENCOUNTER — PATIENT OUTREACH (OUTPATIENT)
Dept: OTHER | Facility: OTHER | Age: 45
End: 2020-10-08

## 2020-10-08 NOTE — PROGRESS NOTES
Digital Medicine: Health  Follow-Up    The history is provided by the patient.             Reason for review: Blood pressure at goal        Topics Covered on Call: physical activity and Diet    Additional Follow-up details: Called patient for follow up. Average /90.            Diet-no change to diet    No change to diet.  Patient reports eating or drinking the following: Patient states he has been a little less conscious of what he's been eating. Overall does try to maintain a low sodium diet. Declined needing resources or help. 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       Physical Activity-no change to routine  No change to exercise routine.       Additional physical activity details: Patient is still weight lifting in his garage gym 4xs/week. Has walked a little less lately, but still gets a couple thousand steps daily.      Medication Adherence-Medication adherence was assessed.      Substance, Sleep, Stress-Not assessed      Continue current diet/physical activity routine.       Addressed patient questions and patient has my contact information if needed prior to next outreach. Patient verbalizes understanding.      Explained the importance of self-monitoring and medication adherence. Encouraged the patient to communicate with their health  for lifestyle modifications to help improve or maintain a healthy lifestyle.               There are no preventive care reminders to display for this patient.      Last 5 Patient Entered Readings                                      Current 30 Day Average: 126/90     Recent Readings 9/30/2020 9/29/2020 9/29/2020 9/28/2020 9/27/2020    SBP (mmHg) 128 132 136 122 122    DBP (mmHg) 89 95 103 86 77    Pulse 98 74 85 81 76

## 2020-10-13 ENCOUNTER — PATIENT OUTREACH (OUTPATIENT)
Dept: OTHER | Facility: OTHER | Age: 45
End: 2020-10-13

## 2020-10-13 DIAGNOSIS — I10 ESSENTIAL HYPERTENSION: Primary | ICD-10-CM

## 2020-11-19 NOTE — PROGRESS NOTES
Digital Medicine: Clinician Follow-Up    Patient states that he is a bit more sedentary than he used to be, and no longer walks as consistently as he was doing. He maintain his recent weight loss, lowest was 200 lbs and he now fluctuates between 205-210 lbs. He works 3 days/week in a hospital, and notices that his BP will be lower when he is off and cooking at home. He attributes this change to his salt intake, and acknowledges a likely salt sensitivity and its effect on his BP.     Patient also notes brenda the has been experiencing ED for the past week, but is unsure of the cause.    The history is provided by the patient.      Review of patient's allergies indicates:  No Known Allergies  Follow-up reason(s): routine follow up.     Hypertension    Readings are trending down   Patient is not experiencing signs/symptoms of hypotension.      Additional Follow-up details: Patient has experiences episode of hypotension in the past, particularly orthostatic. The most recent episode was approximately 1 week ago and lasted from the evening throughout the night.           Last 5 Patient Entered Readings                                      Current 30 Day Average: 114/82     Recent Readings 11/19/2020 11/19/2020 11/19/2020 11/15/2020 11/14/2020    SBP (mmHg) 113 120 100 116 112    DBP (mmHg) 87 90 78 79 83    Pulse 97 103 112 90 91                 Depression Screening  Did not address depression screening.    Sleep Apnea Screening    Did not address sleep apnea screening.     Medication Affordability Screening  Did not address medication affordability screening.     Medication Adherence-Medication adherence was assessed.          ASSESSMENT(S)  Patients BP average is 114/82 mmHg, which is at goal. Patient's BP goal is less than or equal to 130/80.    No med changes recommended at this time, though medication may need to be decreased if BP continues to trend down or the patient is experiencing more hypotension.        Hypertension Plan  Continue current therapy.  Continue current diet/physical activity routine.  Instructed to charge device. Provided instructions on how to check battery life.   Provided patient education. I educated the patient on how to treat hypotension and when to call [facility] on-call or emergency services.   F/u in 6 weeks to see if HTN meds can be decreased to 1/2 tab. Will also follow-up on ED issues and defer to PCP if further workup is needed.     Addressed patient questions and patient has my contact information if needed prior to next outreach. Patient verbalizes understanding.             There are no preventive care reminders to display for this patient.  There are no preventive care reminders to display for this patient.      Hypertension Medications             lisinopriL-hydrochlorothiazide (PRINZIDE,ZESTORETIC) 20-12.5 mg per tablet TAKE 1 TABLET BY MOUTH ONCE DAILY

## 2020-12-13 NOTE — PROGRESS NOTES
Subjective:       Patient ID: Tate Sanford is a 45 y.o. male.    Chief Complaint: Annual Exam    HPI  He presents to the office today requesting a routine periodic health examination.  He is enrolled in the digital medicine clinic for hypertension, and has maintained excellent control of his blood pressures on his present medication regimen.  He is doing well on CPAP; he is followed by [facility].    Patient Active Problem List   Diagnosis    Hypertension    [place] cardiac risk <10% in next 10 years    LOLITA on CPAP    Overweight (BMI 25.0-29.9)    Elevated serum cholesterol       Past Surgical History:   Procedure Laterality Date    WRIST SURGERY Left 1990         Current Outpatient Medications:     aspirin-acetaminophen-caffeine 250-250-65 mg (EXCEDRIN MIGRAINE) 250-250-65 mg per tablet, Take 1 tablet by mouth every 6 (six) hours as needed., Disp: , Rfl:     lisinopriL-hydrochlorothiazide (PRINZIDE,ZESTORETIC) 20-12.5 mg per tablet, TAKE 1 TABLET BY MOUTH ONCE DAILY, Disp: 90 tablet, Rfl: 1    Review of patient's allergies indicates:  No Known Allergies    Family History   Problem Relation Age of Onset    No Known Problems Mother     Lymphoma Father     Heart disease Sister     Breast cancer Maternal Grandmother     No Known Problems Son        Social History     Socioeconomic History    Marital status:      Spouse name: Not on file    Number of children: 1    Years of education: Not on file    Highest education level: Not on file   Occupational History    Occupation: Psychiatrist     Comment: [employer]   Social Needs    Financial resource strain: Not hard at all    Food insecurity     Worry: Never true     Inability: Never true    Transportation needs     Medical: No     Non-medical: No   Tobacco Use    Smoking status: Never Smoker   Substance and Sexual Activity    Alcohol use: Yes     Frequency: Monthly or less     Drinks per session: 1 or 2     Binge frequency: Never    Drug  use: No    Sexual activity: Yes     Partners: Female   Lifestyle    Physical activity     Days per week: 3 days     Minutes per session: 30 min    Stress: Only a little   Relationships    Social connections     Talks on phone: Patient refused     Gets together: Patient refused     Attends Sabianist service: Never     Active member of club or organization: Patient refused     Attends meetings of clubs or organizations: Patient refused     Relationship status:    Other Topics Concern    Not on file   Social History Narrative    The patient does exercise regularly     He is not satisfied with weight.    Rates diet as fair to poor.    He does not drink at least 1/2 gallon water daily.    He drinks coffee/tea/caffeine-containing soft drinks daily.    Total sleep time at night is 7 hours.    He works 40 hours per week.    He does wear seat belts.    Hobbies include none.           Patient Care Team:  Britton Mujica Jr., MD as PCP - General (Family Medicine)  Seema Leiva MA as Care Coordinator  Jaqui Ritter as Digital Medicine Health   Aristeo GarsiaD as Hypertension Digital Medicine Clinician  Britton Mujica Jr., MD as Hypertension Digital Medicine Responsible Provider (Family Medicine)    Health Maintenance   Topic Date Due    Hepatitis C Screening  1975    Lipid Panel  01/31/2025    TETANUS VACCINE  09/05/2027        Review of Systems   Constitutional: Negative for activity change and unexpected weight change.   HENT: Negative for hearing loss, rhinorrhea and trouble swallowing.    Eyes: Negative for discharge and visual disturbance.   Respiratory: Negative for chest tightness and wheezing.    Cardiovascular: Negative for chest pain and palpitations.   Gastrointestinal: Negative for blood in stool, constipation, diarrhea and vomiting.   Endocrine: Negative for polydipsia and polyuria.   Genitourinary: Negative for difficulty urinating, hematuria and urgency.  "  Musculoskeletal: Negative for arthralgias, joint swelling and neck pain.   Neurological: Negative for weakness and headaches.   Psychiatric/Behavioral: Negative for confusion and dysphoric mood.         Objective:      /78   Pulse 76   Ht 6' 3" (1.905 m)   Wt 97.1 kg (214 lb)   SpO2 98%   BMI 26.75 kg/m²     Physical Exam  Vitals signs reviewed.   Constitutional:       Appearance: He is well-developed.   HENT:      Head: Normocephalic and atraumatic.      Right Ear: External ear normal.      Left Ear: External ear normal.      Nose: Nose normal.      Mouth/Throat:      Pharynx: No oropharyngeal exudate.   Eyes:      General: No scleral icterus.     Conjunctiva/sclera: Conjunctivae normal.   Neck:      Musculoskeletal: Neck supple.      Thyroid: No thyromegaly.      Vascular: No carotid bruit or JVD.   Cardiovascular:      Rate and Rhythm: Normal rate and regular rhythm.      Pulses: Normal pulses.      Heart sounds: Normal heart sounds. No murmur. No friction rub. No gallop.    Pulmonary:      Effort: Pulmonary effort is normal.      Breath sounds: Normal breath sounds. No wheezing, rhonchi or rales.   Abdominal:      General: Bowel sounds are normal. There is no distension.      Palpations: Abdomen is soft. There is no hepatomegaly, splenomegaly or mass.      Tenderness: There is no abdominal tenderness.   Musculoskeletal: Normal range of motion.         General: No tenderness.   Lymphadenopathy:      Cervical: No cervical adenopathy.   Skin:     General: Skin is warm and dry.   Neurological:      Mental Status: He is alert and oriented to person, place, and time.      Cranial Nerves: No cranial nerve deficit.      Sensory: No sensory deficit.      Coordination: Coordination normal.      Deep Tendon Reflexes: Reflexes are normal and symmetric.   Psychiatric:         Behavior: Behavior is cooperative.           Assessment:       1. Routine general medical examination at a health care facility    2. " "Essential hypertension    3. LOLITA on CPAP    4. Elevated serum cholesterol    5. [place] cardiac risk <10% in next 10 years        Plan:       Offered influenza vaccine; states he received at work.  Discussed HepC and HIV screening.     RTC 1 year.      "This note will not be shared with the patient."    "

## 2020-12-14 ENCOUNTER — PATIENT MESSAGE (OUTPATIENT)
Dept: ADMINISTRATIVE | Facility: OTHER | Age: 45
End: 2020-12-14

## 2020-12-14 ENCOUNTER — OFFICE VISIT (OUTPATIENT)
Dept: FAMILY MEDICINE | Facility: CLINIC | Age: 45
End: 2020-12-14
Attending: FAMILY MEDICINE
Payer: COMMERCIAL

## 2020-12-14 VITALS
BODY MASS INDEX: 26.61 KG/M2 | OXYGEN SATURATION: 98 % | DIASTOLIC BLOOD PRESSURE: 78 MMHG | SYSTOLIC BLOOD PRESSURE: 124 MMHG | HEIGHT: 75 IN | WEIGHT: 214 LBS | HEART RATE: 76 BPM

## 2020-12-14 DIAGNOSIS — Z91.89 FRAMINGHAM CARDIAC RISK <10% IN NEXT 10 YEARS: ICD-10-CM

## 2020-12-14 DIAGNOSIS — Z00.00 ROUTINE GENERAL MEDICAL EXAMINATION AT A HEALTH CARE FACILITY: Primary | ICD-10-CM

## 2020-12-14 DIAGNOSIS — G47.33 OSA ON CPAP: ICD-10-CM

## 2020-12-14 DIAGNOSIS — I10 ESSENTIAL HYPERTENSION: ICD-10-CM

## 2020-12-14 DIAGNOSIS — Z11.59 NEED FOR HEPATITIS C SCREENING TEST: ICD-10-CM

## 2020-12-14 DIAGNOSIS — E78.00 ELEVATED SERUM CHOLESTEROL: ICD-10-CM

## 2020-12-14 PROCEDURE — 99999 PR PBB SHADOW E&M-EST. PATIENT-LVL III: CPT | Mod: PBBFAC,,, | Performed by: FAMILY MEDICINE

## 2020-12-14 PROCEDURE — 3008F PR BODY MASS INDEX (BMI) DOCUMENTED: ICD-10-PCS | Mod: CPTII,S$GLB,, | Performed by: FAMILY MEDICINE

## 2020-12-14 PROCEDURE — 3078F PR MOST RECENT DIASTOLIC BLOOD PRESSURE < 80 MM HG: ICD-10-PCS | Mod: CPTII,S$GLB,, | Performed by: FAMILY MEDICINE

## 2020-12-14 PROCEDURE — 99999 PR PBB SHADOW E&M-EST. PATIENT-LVL III: ICD-10-PCS | Mod: PBBFAC,,, | Performed by: FAMILY MEDICINE

## 2020-12-14 PROCEDURE — 99396 PR PREVENTIVE VISIT,EST,40-64: ICD-10-PCS | Mod: S$GLB,,, | Performed by: FAMILY MEDICINE

## 2020-12-14 PROCEDURE — 1125F AMNT PAIN NOTED PAIN PRSNT: CPT | Mod: S$GLB,,, | Performed by: FAMILY MEDICINE

## 2020-12-14 PROCEDURE — 3074F SYST BP LT 130 MM HG: CPT | Mod: CPTII,S$GLB,, | Performed by: FAMILY MEDICINE

## 2020-12-14 PROCEDURE — 3008F BODY MASS INDEX DOCD: CPT | Mod: CPTII,S$GLB,, | Performed by: FAMILY MEDICINE

## 2020-12-14 PROCEDURE — 3074F PR MOST RECENT SYSTOLIC BLOOD PRESSURE < 130 MM HG: ICD-10-PCS | Mod: CPTII,S$GLB,, | Performed by: FAMILY MEDICINE

## 2020-12-14 PROCEDURE — 99396 PREV VISIT EST AGE 40-64: CPT | Mod: S$GLB,,, | Performed by: FAMILY MEDICINE

## 2020-12-14 PROCEDURE — 3078F DIAST BP <80 MM HG: CPT | Mod: CPTII,S$GLB,, | Performed by: FAMILY MEDICINE

## 2020-12-14 PROCEDURE — 1125F PR PAIN SEVERITY QUANTIFIED, PAIN PRESENT: ICD-10-PCS | Mod: S$GLB,,, | Performed by: FAMILY MEDICINE

## 2020-12-22 ENCOUNTER — PATIENT OUTREACH (OUTPATIENT)
Dept: ADMINISTRATIVE | Facility: OTHER | Age: 45
End: 2020-12-22

## 2020-12-24 ENCOUNTER — OFFICE VISIT (OUTPATIENT)
Dept: DERMATOLOGY | Facility: CLINIC | Age: 45
End: 2020-12-24
Payer: COMMERCIAL

## 2020-12-24 DIAGNOSIS — B07.9 VIRAL WARTS, UNSPECIFIED TYPE: Primary | ICD-10-CM

## 2020-12-24 PROCEDURE — 99999 PR PBB SHADOW E&M-EST. PATIENT-LVL II: ICD-10-PCS | Mod: PBBFAC,,, | Performed by: DERMATOLOGY

## 2020-12-24 PROCEDURE — 17110 PR DESTRUCTION BENIGN LESIONS UP TO 14: ICD-10-PCS | Mod: S$GLB,,, | Performed by: DERMATOLOGY

## 2020-12-24 PROCEDURE — 99499 UNLISTED E&M SERVICE: CPT | Mod: S$GLB,,, | Performed by: DERMATOLOGY

## 2020-12-24 PROCEDURE — 99499 NO LOS: ICD-10-PCS | Mod: S$GLB,,, | Performed by: DERMATOLOGY

## 2020-12-24 PROCEDURE — 99999 PR PBB SHADOW E&M-EST. PATIENT-LVL II: CPT | Mod: PBBFAC,,, | Performed by: DERMATOLOGY

## 2020-12-24 PROCEDURE — 17110 DESTRUCTION B9 LES UP TO 14: CPT | Mod: S$GLB,,, | Performed by: DERMATOLOGY

## 2020-12-24 NOTE — PATIENT INSTRUCTIONS
MD will freeze wart top of lip, will return if needed.     CRYOSURGERY      Your doctor has used a method called cryosurgery to treat your skin condition. Cryosurgery refers to the use of very cold substances to treat a variety of skin conditions such as warts, pre-skin cancers, molluscum contagiosum, sun spots, and several benign growths. The substance we use in cryosurgery is liquid nitrogen and is so cold (-195 degrees Celsius) that is burns when administered.     Following treatment in the office, the skin may immediately burn and become red. You may find the area around the lesion is affected as well. It is sometimes necessary to treat not only the lesion, but a small area of the surrounding normal skin to achieve a good response.     A blister, and even a blood filled blister, may form after treatment.   This is a normal response. If the blister is painful, it is acceptable to sterilize a needle and with rubbing alcohol and gently pop the blister. It is important that you gently wash the area with soap and warm water as the blister fluid may contain wart virus if a wart was treated. Do no remove the roof of the blister.     The area treated can take anywhere from 1-3 weeks to heal. Healing time depends on the kind skin lesion treated, the location, and how aggressively the lesion was treated. It is recommended that the areas treated are covered with Vaseline or bacitracin ointment and a band-aid. If a band-aid is not practical, just ointment applied several times per day will do. Keeping these areas moist will speed the healing time.    Treatment with liquid nitrogen can leave a scar. In dark skin, it may be a light or dark scar, in light skin it may be a white or pink scar. These will generally fade with time, but may never go away completely.     If you have any concerns after your treatment, please feel free to call the office.       [address], [place] [address]/ [phone]  [phone] FAX/ [email or web address]

## 2020-12-24 NOTE — PROGRESS NOTES
Subjective:       Patient ID:  Tate Sanford is a 45 y.o. male who presents for   Chief Complaint   Patient presents with    Warts     between nose and lip about one week no pain no drainage tx of topical??      HPI    Review of Systems   Constitutional: Negative.    HENT: Negative.    Eyes: Negative.    Respiratory: Negative.    Cardiovascular: Negative.    Gastrointestinal: Negative.    Genitourinary: Negative.    Musculoskeletal: Negative.    Skin: Positive for daily sunscreen use (prn ) and wears hat (rarely).   Neurological: Negative.    Psychiatric/Behavioral: Negative.    Endocrine: Negative.    Hematologic/Lymphatic: Negative.    Allergic/Immunologic: Negative.         Objective:    Physical Exam   Constitutional: He appears well-developed and well-nourished.   Neurological: He is alert and oriented to person, place, and time.   Psychiatric: He has a normal mood and affect.   Skin:                 Diagram Legend     Erythematous scaling macule/papule c/w actinic keratosis       Vascular papule c/w angioma      Pigmented verrucoid papule/plaque c/w seborrheic keratosis      Yellow umbilicated papule c/w sebaceous hyperplasia      Irregularly shaped tan macule c/w lentigo     1-2 mm smooth white papules consistent with Milia      Movable subcutaneous cyst with punctum c/w epidermal inclusion cyst      Subcutaneous movable cyst c/w pilar cyst      Firm pink to brown papule c/w dermatofibroma      Pedunculated fleshy papule(s) c/w skin tag(s)      Evenly pigmented macule c/w junctional nevus     Mildly variegated pigmented, slightly irregular-bordered macule c/w mildly atypical nevus      Flesh colored to evenly pigmented papule c/w intradermal nevus       Pink pearly papule/plaque c/w basal cell carcinoma      Erythematous hyperkeratotic cursted plaque c/w SCC      Surgical scar with no sign of skin cancer recurrence      Open and closed comedones      Inflammatory papules and pustules       Verrucoid papule consistent consistent with wart     Erythematous eczematous patches and plaques     Dystrophic onycholytic nail with subungual debris c/w onychomycosis     Umbilicated papule    Erythematous-base heme-crusted tan verrucoid plaque consistent with inflamed seborrheic keratosis     Erythematous Silvery Scaling Plaque c/w Psoriasis     See annotation      Assessment / Plan:        Viral warts, unspecified type  Discussed with patient the etiology and pathogenesis of the disease or skin lesion(s) and possible treatments and aggravators.    Reviewed with patient different treatment options and associated risks.  Cryosurgery procedure note:    Verbal consent from the patient is obtained including, but not limited to, risk of hypopigmentation/hyperpigmentation, scar, recurrence of lesion. Liquid nitrogen cryosurgery is applied to 1 verruca with prior paring, as detailed in the physical exam, to produce a freeze injury. 2 consecutive freeze thaw cycles are applied to each verruca. The patient is aware that blisters (possibly blood blisters) may form.             Follow up if symptoms worsen or fail to improve.

## 2020-12-31 ENCOUNTER — PATIENT OUTREACH (OUTPATIENT)
Dept: OTHER | Facility: OTHER | Age: 45
End: 2020-12-31

## 2021-01-14 ENCOUNTER — LAB VISIT (OUTPATIENT)
Dept: PRIMARY CARE CLINIC | Facility: OTHER | Age: 46
End: 2021-01-14
Payer: COMMERCIAL

## 2021-01-14 DIAGNOSIS — Z20.822 ENCOUNTER FOR LABORATORY TESTING FOR COVID-19 VIRUS: ICD-10-CM

## 2021-01-14 PROCEDURE — U0003 INFECTIOUS AGENT DETECTION BY NUCLEIC ACID (DNA OR RNA); SEVERE ACUTE RESPIRATORY SYNDROME CORONAVIRUS 2 (SARS-COV-2) (CORONAVIRUS DISEASE [COVID-19]), AMPLIFIED PROBE TECHNIQUE, MAKING USE OF HIGH THROUGHPUT TECHNOLOGIES AS DESCRIBED BY CMS-2020-01-R: HCPCS

## 2021-01-16 LAB — SARS-COV-2 RNA RESP QL NAA+PROBE: NOT DETECTED

## 2021-02-01 ENCOUNTER — PATIENT MESSAGE (OUTPATIENT)
Dept: FAMILY MEDICINE | Facility: CLINIC | Age: 46
End: 2021-02-01

## 2021-02-01 DIAGNOSIS — Z01.84 IMMUNITY STATUS TESTING: ICD-10-CM

## 2021-02-01 DIAGNOSIS — Z20.2 EXPOSURE TO CHLAMYDIA: ICD-10-CM

## 2021-02-01 DIAGNOSIS — Z11.3 SCREENING FOR STD (SEXUALLY TRANSMITTED DISEASE): Primary | ICD-10-CM

## 2021-02-05 LAB
ALBUMIN/CREAT UR: 12 MCG/MG CREAT
BUN SERPL-MCNC: 18 MG/DL (ref 7–25)
BUN/CREAT SERPL: NORMAL (CALC) (ref 6–22)
CALCIUM SERPL-MCNC: 9.8 MG/DL (ref 8.6–10.3)
CHLORIDE SERPL-SCNC: 102 MMOL/L (ref 98–110)
CO2 SERPL-SCNC: 30 MMOL/L (ref 20–32)
CREAT SERPL-MCNC: 1.31 MG/DL (ref 0.6–1.35)
CREAT UR-MCNC: 280 MG/DL (ref 20–320)
GFRSERPLBLD MDRD-ARVRAT: 65 ML/MIN/1.73M2
GLUCOSE SERPL-MCNC: 90 MG/DL (ref 65–99)
HBV SURFACE AB SER QL IA: REACTIVE
HBV SURFACE AG SERPL QL IA: NORMAL
HCV AB S/CO SERPL IA: 0.01
HCV AB SERPL QL IA: NORMAL
HIV 1+2 AB+HIV1 P24 AG SERPL QL IA: NORMAL
MICROALBUMIN UR-MCNC: 3.3 MG/DL
POTASSIUM SERPL-SCNC: 4.6 MMOL/L (ref 3.5–5.3)
RPR SER QL: NORMAL
SODIUM SERPL-SCNC: 141 MMOL/L (ref 135–146)

## 2021-02-07 ENCOUNTER — PATIENT MESSAGE (OUTPATIENT)
Dept: FAMILY MEDICINE | Facility: CLINIC | Age: 46
End: 2021-02-07

## 2021-02-07 DIAGNOSIS — Z11.3 SCREENING FOR STD (SEXUALLY TRANSMITTED DISEASE): Primary | ICD-10-CM

## 2021-02-11 ENCOUNTER — LAB VISIT (OUTPATIENT)
Dept: PRIMARY CARE CLINIC | Facility: OTHER | Age: 46
End: 2021-02-11
Attending: INTERNAL MEDICINE
Payer: COMMERCIAL

## 2021-02-11 DIAGNOSIS — Z20.822 ENCOUNTER FOR LABORATORY TESTING FOR COVID-19 VIRUS: ICD-10-CM

## 2021-02-11 PROCEDURE — U0003 INFECTIOUS AGENT DETECTION BY NUCLEIC ACID (DNA OR RNA); SEVERE ACUTE RESPIRATORY SYNDROME CORONAVIRUS 2 (SARS-COV-2) (CORONAVIRUS DISEASE [COVID-19]), AMPLIFIED PROBE TECHNIQUE, MAKING USE OF HIGH THROUGHPUT TECHNOLOGIES AS DESCRIBED BY CMS-2020-01-R: HCPCS

## 2021-02-12 ENCOUNTER — PATIENT MESSAGE (OUTPATIENT)
Dept: FAMILY MEDICINE | Facility: CLINIC | Age: 46
End: 2021-02-12

## 2021-02-12 LAB — SARS-COV-2 RNA RESP QL NAA+PROBE: NOT DETECTED

## 2021-02-14 ENCOUNTER — PATIENT MESSAGE (OUTPATIENT)
Dept: FAMILY MEDICINE | Facility: CLINIC | Age: 46
End: 2021-02-14

## 2021-02-19 LAB
C TRACH RRNA SPEC QL NAA+PROBE: NOT DETECTED
COMMENT: NORMAL
N GONORRHOEA RRNA SPEC QL NAA+PROBE: NOT DETECTED

## 2021-02-22 ENCOUNTER — OFFICE VISIT (OUTPATIENT)
Dept: FAMILY MEDICINE | Facility: CLINIC | Age: 46
End: 2021-02-22
Attending: FAMILY MEDICINE
Payer: COMMERCIAL

## 2021-02-22 VITALS
BODY MASS INDEX: 27.48 KG/M2 | HEIGHT: 75 IN | HEART RATE: 90 BPM | OXYGEN SATURATION: 97 % | DIASTOLIC BLOOD PRESSURE: 76 MMHG | SYSTOLIC BLOOD PRESSURE: 110 MMHG | WEIGHT: 221 LBS

## 2021-02-22 DIAGNOSIS — E78.00 ELEVATED SERUM CHOLESTEROL: ICD-10-CM

## 2021-02-22 DIAGNOSIS — I10 ESSENTIAL HYPERTENSION: ICD-10-CM

## 2021-02-22 DIAGNOSIS — G47.33 OSA ON CPAP: ICD-10-CM

## 2021-02-22 DIAGNOSIS — Z63.5 MARITAL ESTRANGEMENT: Primary | ICD-10-CM

## 2021-02-22 PROCEDURE — 3008F BODY MASS INDEX DOCD: CPT | Mod: CPTII,S$GLB,, | Performed by: FAMILY MEDICINE

## 2021-02-22 PROCEDURE — 99999 PR PBB SHADOW E&M-EST. PATIENT-LVL III: ICD-10-PCS | Mod: PBBFAC,,, | Performed by: FAMILY MEDICINE

## 2021-02-22 PROCEDURE — 99214 OFFICE O/P EST MOD 30 MIN: CPT | Mod: S$GLB,,, | Performed by: FAMILY MEDICINE

## 2021-02-22 PROCEDURE — 3074F SYST BP LT 130 MM HG: CPT | Mod: CPTII,S$GLB,, | Performed by: FAMILY MEDICINE

## 2021-02-22 PROCEDURE — 3078F PR MOST RECENT DIASTOLIC BLOOD PRESSURE < 80 MM HG: ICD-10-PCS | Mod: CPTII,S$GLB,, | Performed by: FAMILY MEDICINE

## 2021-02-22 PROCEDURE — 1126F PR PAIN SEVERITY QUANTIFIED, NO PAIN PRESENT: ICD-10-PCS | Mod: S$GLB,,, | Performed by: FAMILY MEDICINE

## 2021-02-22 PROCEDURE — 99999 PR PBB SHADOW E&M-EST. PATIENT-LVL III: CPT | Mod: PBBFAC,,, | Performed by: FAMILY MEDICINE

## 2021-02-22 PROCEDURE — 3074F PR MOST RECENT SYSTOLIC BLOOD PRESSURE < 130 MM HG: ICD-10-PCS | Mod: CPTII,S$GLB,, | Performed by: FAMILY MEDICINE

## 2021-02-22 PROCEDURE — 1126F AMNT PAIN NOTED NONE PRSNT: CPT | Mod: S$GLB,,, | Performed by: FAMILY MEDICINE

## 2021-02-22 PROCEDURE — 3078F DIAST BP <80 MM HG: CPT | Mod: CPTII,S$GLB,, | Performed by: FAMILY MEDICINE

## 2021-02-22 PROCEDURE — 3008F PR BODY MASS INDEX (BMI) DOCUMENTED: ICD-10-PCS | Mod: CPTII,S$GLB,, | Performed by: FAMILY MEDICINE

## 2021-02-22 PROCEDURE — 99214 PR OFFICE/OUTPT VISIT, EST, LEVL IV, 30-39 MIN: ICD-10-PCS | Mod: S$GLB,,, | Performed by: FAMILY MEDICINE

## 2021-02-22 SDOH — SOCIAL DETERMINANTS OF HEALTH (SDOH): DISRUPTION OF FAMILY BY SEPARATION AND DIVORCE: Z63.5

## 2021-03-11 ENCOUNTER — LAB VISIT (OUTPATIENT)
Dept: PRIMARY CARE CLINIC | Facility: OTHER | Age: 46
End: 2021-03-11
Attending: INTERNAL MEDICINE
Payer: COMMERCIAL

## 2021-03-11 DIAGNOSIS — Z20.822 ENCOUNTER FOR LABORATORY TESTING FOR COVID-19 VIRUS: ICD-10-CM

## 2021-03-11 PROCEDURE — U0003 INFECTIOUS AGENT DETECTION BY NUCLEIC ACID (DNA OR RNA); SEVERE ACUTE RESPIRATORY SYNDROME CORONAVIRUS 2 (SARS-COV-2) (CORONAVIRUS DISEASE [COVID-19]), AMPLIFIED PROBE TECHNIQUE, MAKING USE OF HIGH THROUGHPUT TECHNOLOGIES AS DESCRIBED BY CMS-2020-01-R: HCPCS | Performed by: INTERNAL MEDICINE

## 2021-03-12 LAB — SARS-COV-2 RNA RESP QL NAA+PROBE: NOT DETECTED

## 2021-04-05 ENCOUNTER — LAB VISIT (OUTPATIENT)
Dept: PRIMARY CARE CLINIC | Facility: OTHER | Age: 46
End: 2021-04-05
Payer: COMMERCIAL

## 2021-04-05 DIAGNOSIS — Z20.822 ENCOUNTER FOR LABORATORY TESTING FOR COVID-19 VIRUS: ICD-10-CM

## 2021-04-05 PROCEDURE — U0003 INFECTIOUS AGENT DETECTION BY NUCLEIC ACID (DNA OR RNA); SEVERE ACUTE RESPIRATORY SYNDROME CORONAVIRUS 2 (SARS-COV-2) (CORONAVIRUS DISEASE [COVID-19]), AMPLIFIED PROBE TECHNIQUE, MAKING USE OF HIGH THROUGHPUT TECHNOLOGIES AS DESCRIBED BY CMS-2020-01-R: HCPCS | Performed by: INTERNAL MEDICINE

## 2021-04-07 LAB — SARS-COV-2 RNA RESP QL NAA+PROBE: NOT DETECTED

## 2021-05-03 ENCOUNTER — LAB VISIT (OUTPATIENT)
Dept: PRIMARY CARE CLINIC | Facility: OTHER | Age: 46
End: 2021-05-03
Attending: INTERNAL MEDICINE
Payer: COMMERCIAL

## 2021-05-03 DIAGNOSIS — Z20.822 ENCOUNTER FOR LABORATORY TESTING FOR COVID-19 VIRUS: ICD-10-CM

## 2021-05-03 PROCEDURE — U0003 INFECTIOUS AGENT DETECTION BY NUCLEIC ACID (DNA OR RNA); SEVERE ACUTE RESPIRATORY SYNDROME CORONAVIRUS 2 (SARS-COV-2) (CORONAVIRUS DISEASE [COVID-19]), AMPLIFIED PROBE TECHNIQUE, MAKING USE OF HIGH THROUGHPUT TECHNOLOGIES AS DESCRIBED BY CMS-2020-01-R: HCPCS | Performed by: INTERNAL MEDICINE

## 2021-05-05 LAB — SARS-COV-2 RNA RESP QL NAA+PROBE: NOT DETECTED

## 2021-06-14 ENCOUNTER — LAB VISIT (OUTPATIENT)
Dept: PRIMARY CARE CLINIC | Facility: OTHER | Age: 46
End: 2021-06-14
Attending: INTERNAL MEDICINE
Payer: COMMERCIAL

## 2021-06-14 DIAGNOSIS — Z20.822 ENCOUNTER FOR LABORATORY TESTING FOR COVID-19 VIRUS: ICD-10-CM

## 2021-06-14 PROCEDURE — U0003 INFECTIOUS AGENT DETECTION BY NUCLEIC ACID (DNA OR RNA); SEVERE ACUTE RESPIRATORY SYNDROME CORONAVIRUS 2 (SARS-COV-2) (CORONAVIRUS DISEASE [COVID-19]), AMPLIFIED PROBE TECHNIQUE, MAKING USE OF HIGH THROUGHPUT TECHNOLOGIES AS DESCRIBED BY CMS-2020-01-R: HCPCS | Performed by: INTERNAL MEDICINE

## 2021-06-15 LAB — SARS-COV-2 RNA RESP QL NAA+PROBE: NOT DETECTED

## 2021-07-13 ENCOUNTER — LAB VISIT (OUTPATIENT)
Dept: PRIMARY CARE CLINIC | Facility: OTHER | Age: 46
End: 2021-07-13
Payer: COMMERCIAL

## 2021-07-13 DIAGNOSIS — Z20.822 ENCOUNTER FOR LABORATORY TESTING FOR COVID-19 VIRUS: ICD-10-CM

## 2021-07-13 PROCEDURE — U0003 INFECTIOUS AGENT DETECTION BY NUCLEIC ACID (DNA OR RNA); SEVERE ACUTE RESPIRATORY SYNDROME CORONAVIRUS 2 (SARS-COV-2) (CORONAVIRUS DISEASE [COVID-19]), AMPLIFIED PROBE TECHNIQUE, MAKING USE OF HIGH THROUGHPUT TECHNOLOGIES AS DESCRIBED BY CMS-2020-01-R: HCPCS | Performed by: INTERNAL MEDICINE

## 2021-07-14 LAB
SARS-COV-2 RNA RESP QL NAA+PROBE: NOT DETECTED
SARS-COV-2- CYCLE NUMBER: -1

## 2021-07-15 ENCOUNTER — PATIENT MESSAGE (OUTPATIENT)
Dept: INTERNAL MEDICINE | Facility: CLINIC | Age: 46
End: 2021-07-15

## 2021-08-02 ENCOUNTER — LAB VISIT (OUTPATIENT)
Dept: PRIMARY CARE CLINIC | Facility: OTHER | Age: 46
End: 2021-08-02
Payer: COMMERCIAL

## 2021-08-02 DIAGNOSIS — Z20.822 ENCOUNTER FOR LABORATORY TESTING FOR COVID-19 VIRUS: ICD-10-CM

## 2021-08-02 PROCEDURE — U0003 INFECTIOUS AGENT DETECTION BY NUCLEIC ACID (DNA OR RNA); SEVERE ACUTE RESPIRATORY SYNDROME CORONAVIRUS 2 (SARS-COV-2) (CORONAVIRUS DISEASE [COVID-19]), AMPLIFIED PROBE TECHNIQUE, MAKING USE OF HIGH THROUGHPUT TECHNOLOGIES AS DESCRIBED BY CMS-2020-01-R: HCPCS | Performed by: INTERNAL MEDICINE

## 2021-08-03 LAB
SARS-COV-2 RNA RESP QL NAA+PROBE: NOT DETECTED
SARS-COV-2- CYCLE NUMBER: -1

## 2021-09-20 ENCOUNTER — LAB VISIT (OUTPATIENT)
Dept: PRIMARY CARE CLINIC | Facility: OTHER | Age: 46
End: 2021-09-20
Payer: COMMERCIAL

## 2021-09-20 DIAGNOSIS — Z20.822 ENCOUNTER FOR LABORATORY TESTING FOR COVID-19 VIRUS: ICD-10-CM

## 2021-09-20 PROCEDURE — U0003 INFECTIOUS AGENT DETECTION BY NUCLEIC ACID (DNA OR RNA); SEVERE ACUTE RESPIRATORY SYNDROME CORONAVIRUS 2 (SARS-COV-2) (CORONAVIRUS DISEASE [COVID-19]), AMPLIFIED PROBE TECHNIQUE, MAKING USE OF HIGH THROUGHPUT TECHNOLOGIES AS DESCRIBED BY CMS-2020-01-R: HCPCS

## 2021-09-21 LAB
SARS-COV-2 RNA RESP QL NAA+PROBE: NOT DETECTED
SARS-COV-2- CYCLE NUMBER: NORMAL

## 2021-11-08 ENCOUNTER — LAB VISIT (OUTPATIENT)
Dept: PRIMARY CARE CLINIC | Facility: OTHER | Age: 46
End: 2021-11-08
Payer: COMMERCIAL

## 2021-11-08 DIAGNOSIS — Z20.822 ENCOUNTER FOR LABORATORY TESTING FOR COVID-19 VIRUS: ICD-10-CM

## 2021-11-08 PROCEDURE — U0003 INFECTIOUS AGENT DETECTION BY NUCLEIC ACID (DNA OR RNA); SEVERE ACUTE RESPIRATORY SYNDROME CORONAVIRUS 2 (SARS-COV-2) (CORONAVIRUS DISEASE [COVID-19]), AMPLIFIED PROBE TECHNIQUE, MAKING USE OF HIGH THROUGHPUT TECHNOLOGIES AS DESCRIBED BY CMS-2020-01-R: HCPCS

## 2021-11-09 LAB
SARS-COV-2 RNA RESP QL NAA+PROBE: NOT DETECTED
SARS-COV-2- CYCLE NUMBER: NORMAL

## 2021-12-06 ENCOUNTER — LAB VISIT (OUTPATIENT)
Dept: PRIMARY CARE CLINIC | Facility: OTHER | Age: 46
End: 2021-12-06
Payer: COMMERCIAL

## 2021-12-06 DIAGNOSIS — Z20.822 ENCOUNTER FOR LABORATORY TESTING FOR COVID-19 VIRUS: ICD-10-CM

## 2021-12-06 LAB
SARS-COV-2 RNA RESP QL NAA+PROBE: NOT DETECTED
SARS-COV-2- CYCLE NUMBER: NORMAL

## 2021-12-06 PROCEDURE — U0003 INFECTIOUS AGENT DETECTION BY NUCLEIC ACID (DNA OR RNA); SEVERE ACUTE RESPIRATORY SYNDROME CORONAVIRUS 2 (SARS-COV-2) (CORONAVIRUS DISEASE [COVID-19]), AMPLIFIED PROBE TECHNIQUE, MAKING USE OF HIGH THROUGHPUT TECHNOLOGIES AS DESCRIBED BY CMS-2020-01-R: HCPCS | Performed by: INTERNAL MEDICINE

## 2022-01-09 ENCOUNTER — OFFICE VISIT (OUTPATIENT)
Dept: URGENT CARE | Facility: CLINIC | Age: 47
End: 2022-01-09
Payer: COMMERCIAL

## 2022-01-09 VITALS
HEART RATE: 74 BPM | BODY MASS INDEX: 27.48 KG/M2 | HEIGHT: 75 IN | OXYGEN SATURATION: 98 % | WEIGHT: 221 LBS | DIASTOLIC BLOOD PRESSURE: 93 MMHG | SYSTOLIC BLOOD PRESSURE: 148 MMHG | TEMPERATURE: 98 F | RESPIRATION RATE: 18 BRPM

## 2022-01-09 DIAGNOSIS — H10.9 CONJUNCTIVITIS OF RIGHT EYE, UNSPECIFIED CONJUNCTIVITIS TYPE: Primary | ICD-10-CM

## 2022-01-09 PROCEDURE — 1159F MED LIST DOCD IN RCRD: CPT | Mod: CPTII,S$GLB,, | Performed by: NURSE PRACTITIONER

## 2022-01-09 PROCEDURE — 1160F PR REVIEW ALL MEDS BY PRESCRIBER/CLIN PHARMACIST DOCUMENTED: ICD-10-PCS | Mod: CPTII,S$GLB,, | Performed by: NURSE PRACTITIONER

## 2022-01-09 PROCEDURE — 3008F PR BODY MASS INDEX (BMI) DOCUMENTED: ICD-10-PCS | Mod: CPTII,S$GLB,, | Performed by: NURSE PRACTITIONER

## 2022-01-09 PROCEDURE — 99214 PR OFFICE/OUTPT VISIT, EST, LEVL IV, 30-39 MIN: ICD-10-PCS | Mod: S$GLB,,, | Performed by: NURSE PRACTITIONER

## 2022-01-09 PROCEDURE — 3077F PR MOST RECENT SYSTOLIC BLOOD PRESSURE >= 140 MM HG: ICD-10-PCS | Mod: CPTII,S$GLB,, | Performed by: NURSE PRACTITIONER

## 2022-01-09 PROCEDURE — 3080F PR MOST RECENT DIASTOLIC BLOOD PRESSURE >= 90 MM HG: ICD-10-PCS | Mod: CPTII,S$GLB,, | Performed by: NURSE PRACTITIONER

## 2022-01-09 PROCEDURE — 3077F SYST BP >= 140 MM HG: CPT | Mod: CPTII,S$GLB,, | Performed by: NURSE PRACTITIONER

## 2022-01-09 PROCEDURE — 1160F RVW MEDS BY RX/DR IN RCRD: CPT | Mod: CPTII,S$GLB,, | Performed by: NURSE PRACTITIONER

## 2022-01-09 PROCEDURE — 99214 OFFICE O/P EST MOD 30 MIN: CPT | Mod: S$GLB,,, | Performed by: NURSE PRACTITIONER

## 2022-01-09 PROCEDURE — 1159F PR MEDICATION LIST DOCUMENTED IN MEDICAL RECORD: ICD-10-PCS | Mod: CPTII,S$GLB,, | Performed by: NURSE PRACTITIONER

## 2022-01-09 PROCEDURE — 3008F BODY MASS INDEX DOCD: CPT | Mod: CPTII,S$GLB,, | Performed by: NURSE PRACTITIONER

## 2022-01-09 PROCEDURE — 3080F DIAST BP >= 90 MM HG: CPT | Mod: CPTII,S$GLB,, | Performed by: NURSE PRACTITIONER

## 2022-01-09 RX ORDER — CIPROFLOXACIN HYDROCHLORIDE 3 MG/ML
SOLUTION/ DROPS OPHTHALMIC
Qty: 10 ML | Refills: 0 | Status: SHIPPED | OUTPATIENT
Start: 2022-01-09 | End: 2022-03-31

## 2022-01-09 NOTE — PROGRESS NOTES
"Subjective:       Patient ID: Tate Sanford is a 46 y.o. male.    Vitals:  height is 6' 3" (1.905 m) and weight is 100.2 kg (221 lb). His temperature is 98 °F (36.7 °C). His blood pressure is 148/93 (abnormal) and his pulse is 74. His respiration is 18 and oxygen saturation is 98%.     Chief Complaint: Eye Problem    Eye Problem   The right eye is affected. This is a new problem. The current episode started today (started at 6am ). The problem occurs constantly. The problem has been gradually worsening. The pain is at a severity of 3/10. The pain is mild. There is no known exposure to pink eye. He does not wear contacts. Associated symptoms include an eye discharge, eye redness and a foreign body sensation. Pertinent negatives include no blurred vision, fever, itching, nausea or vomiting. He has tried nothing for the symptoms.       Constitution: Negative for chills, sweating, fatigue, fever and generalized weakness.   HENT: Negative for ear pain, ear discharge, congestion, postnasal drip, sinus pain, sinus pressure, sore throat and voice change.    Neck: Negative for neck pain and neck stiffness.   Cardiovascular: Negative for chest pain, palpitations and sob on exertion.   Eyes: Positive for eye discharge and eye redness. Negative for eye itching and blurred vision.   Respiratory: Negative for chest tightness, cough, shortness of breath and wheezing.    Gastrointestinal: Negative for abdominal pain, nausea, vomiting and diarrhea.   Musculoskeletal: Negative for muscle ache.   Neurological: Negative for dizziness, light-headedness and headaches.       Objective:      Physical Exam   Constitutional: He is oriented to person, place, and time. He appears well-developed and well-nourished.  Non-toxic appearance. He does not appear ill. No distress.   HENT:   Head: Normocephalic and atraumatic.   Ears:   Right Ear: Hearing and external ear normal.   Left Ear: Hearing and external ear normal.   Nose: Nose " normal.   Mouth/Throat: Uvula is midline, oropharynx is clear and moist and mucous membranes are normal.   Eyes: EOM and lids are normal. Pupils are equal, round, and reactive to light. Lids are everted and swept, no foreign bodies found. No visual field deficit is present. Right eye exhibits discharge (clear ). Right eye exhibits no chemosis, no exudate and no hordeolum. No foreign body present in the right eye. Left eye exhibits no chemosis, no discharge, no exudate and no hordeolum. No foreign body present in the left eye. Right conjunctiva is injected. Right conjunctiva has no hemorrhage. Left conjunctiva is not injected. Left conjunctiva has no hemorrhage. No scleral icterus. Right eye exhibits normal extraocular motion and no nystagmus. Left eye exhibits normal extraocular motion and no nystagmus.   vision grossly intact gaze aligned appropriately periorbital hyperpigmentation  Neck: Trachea normal and phonation normal. Neck supple.   Cardiovascular: Normal rate, regular rhythm, S1 normal, S2 normal, normal heart sounds and normal pulses. Exam reveals no decreased pulses.   Pulmonary/Chest: No accessory muscle usage. No respiratory distress. He has no decreased breath sounds. He has no wheezes. He has no rhonchi. He has no rales.   Abdominal: Normal appearance.   Musculoskeletal: Normal range of motion.         General: Normal range of motion.   Lymphadenopathy:     He has no cervical adenopathy.   Neurological: He is alert and oriented to person, place, and time.   Skin: Skin is warm, dry, intact and not diaphoretic.   Psychiatric: He has a normal mood and affect. His speech is normal and behavior is normal. Judgment and thought content normal. Cognition and memory  Nursing note and vitals reviewed.        Assessment:       1. Conjunctivitis of right eye, unspecified conjunctivitis type          Plan:         Discussed  Diagnosis plan with patient who verbalized understanding.  He requested Cipro eye drops  which I granted the request.  He agrees with plan of care.  Patient given educational handouts supporting this diagnosis as well.  He denied any further questions or concerns at this time.  Patient exits exam room in no acute distress.    Conjunctivitis of right eye, unspecified conjunctivitis type  -     ciprofloxacin HCl (CILOXAN) 0.3 % ophthalmic solution; 1-2 drops in right eye every 2hrs while awake for 2 days, then every 4 hrs for 5 days  Dispense: 10 mL; Refill: 0      Patient Instructions                                      Conjunctivitis  If your condition worsens or fails to improve we recommend that you receive another evaluation at the ER immediately or contact your PCP to discuss your concerns or return here. You must understand that you've received an urgent care treatment only and that you may be released before all your medical problems are known or treated. You the patient will arrange for followup care as instructed.   Keep eyes cleaned.  Use the eye drops as prescribed.    Do not wear your contact lens ( if you use them) for at least 5 days after you stop having symptoms and are rechecked by your doctor.   Avoid sharing towels while infection persist.  Cool compresses to affected eye.   Throw away the contacts, contact solution and carrying case you were using and start with new material.   If you develop increase eye symptoms or change in your vision seek medical care immediately either with your ophthalomologist or the ER or return here. Patient Education       Conjunctivitis (Pinkeye) Discharge Instructions   About this topic   The medical name for pink eye is conjunctivitis. Your eye is irritated or infected. It is red and irritated. Your eye may also itch, burn, or be sensitive to light. If an infection is causing your pink eye, it is easy to spread from person to person.  Infections are often caused by viruses and antibiotics wont help. Most of the time, pink eye will go away on its own  without treatment after a few days.  If the doctor thinks you have a bacterial infection in your eye, you will need antibiotics to treat the infection. It is important to take all of your antibiotics even if your eye starts to feel better.       What care is needed at home?   · Ask your doctor what you need to do when you go home. Make sure you ask questions if you do not understand what the doctor says.  · Wash your hands before touching your eyes. Gently remove your eye drainage or crusting with a clean cloth and warm water.  · Avoid pollen if an allergy is causing your pink eye. Stay inside as much as you can with the windows closed during peak allergy seasons.  · Lubricating eye drops or allergy eye drops may help your eye feel better. Make sure to read the directions carefully. Wash your hands with care before and after you touch your eye.  · When you use eye drops, take care not to touch the bottle or dropper to your eye.  · If you wear contact lenses, you will need to stop wearing them for a short time until your symptoms go away. If your contacts are disposable, start with fresh ones after your eye gets better. If not, clean your contacts with care. Clean your contact case thoroughly or get a new one.  · Avoid sharing towels, washcloths, bedding, or personal items when you have pink eye.  · You may need to stay out of work or school for a few days.  What follow-up care is needed?   Your doctor may ask you to make visits to the office to check on your progress. Be sure to keep these visits.  What drugs may be needed?   The doctor may order drugs based on the cause of your health problem. Talk to your doctor about what drugs you need to take.   Will physical activity be limited?   Your physical activities will not be limited. Remember, this infection spreads easily from person to person. Ask your doctor when you can go back to work or school.  What problems could happen?   You may be infected again from  someone in your house, workplace, or school.  What can be done to prevent this health problem?   Good hygiene can help prevent the spread of this infection.  · Wash your hands well and often, after touching your face, and after you cough or sneeze.  · Do not share eye make-up or eye drops with others.  · Do not share pillows or towels with others.  · Clean contact lenses daily. Do not sleep in them unless your eye doctor says it is OK.  When do I need to call the doctor?   · You have trouble seeing clearly after blinking.  · Your eye is still red or has drainage after 3 days.  · You have eye pain that is getting worse.  Teach Back: Helping You Understand   The Teach Back Method helps you understand the information we are giving you. After you talk with the staff, tell them in your own words what you learned. This helps to make sure the staff has described each thing clearly. It also helps to explain things that may have been confusing. Before going home, make sure you can do these:  · I can tell you about my condition.  · I can tell you how to care for my eye.  · I can tell you what I will do if I have eye pain or blurry eyesight.  Where can I learn more?   [facility]org  http://familydoctor.org/familydoctor/en/diseases-conditions/allergic-conjunctivitis.html   [facility]ids [facility]  http://kidshealth.org/en/parents/conjunctivitis.html?ref=search&WT.ac=rxe-i-fqve-xt-bkqkee-ioo   [facility] Choices  https://www.nhs.uk/conditions/conjunctivitis/   Last Reviewed Date   2021-06-10  Consumer Information Use and Disclaimer   This information is not specific medical advice and does not replace information you receive from your health care provider. This is only a brief summary of general information. It does NOT include all information about conditions, illnesses, injuries, tests, procedures, treatments, therapies, discharge instructions or life-style choices that may apply to you. You must talk with your health care provider for  complete information about your health and treatment options. This information should not be used to decide whether or not to accept your health care providers advice, instructions or recommendations. Only your health care provider has the knowledge and training to provide advice that is right for you.  Copyright   Copyright © 2021 [employer] and its affiliates and/or licensors. All rights reserved.

## 2022-01-09 NOTE — PATIENT INSTRUCTIONS
Conjunctivitis  If your condition worsens or fails to improve we recommend that you receive another evaluation at the ER immediately or contact your PCP to discuss your concerns or return here. You must understand that you've received an urgent care treatment only and that you may be released before all your medical problems are known or treated. You the patient will arrange for followup care as instructed.   Keep eyes cleaned.  Use the eye drops as prescribed.    Do not wear your contact lens ( if you use them) for at least 5 days after you stop having symptoms and are rechecked by your doctor.   Avoid sharing towels while infection persist.  Cool compresses to affected eye.   Throw away the contacts, contact solution and carrying case you were using and start with new material.   If you develop increase eye symptoms or change in your vision seek medical care immediately either with your ophthalomologist or the ER or return here. Patient Education       Conjunctivitis (Pinkeye) Discharge Instructions   About this topic   The medical name for pink eye is conjunctivitis. Your eye is irritated or infected. It is red and irritated. Your eye may also itch, burn, or be sensitive to light. If an infection is causing your pink eye, it is easy to spread from person to person.  Infections are often caused by viruses and antibiotics wont help. Most of the time, pink eye will go away on its own without treatment after a few days.  If the doctor thinks you have a bacterial infection in your eye, you will need antibiotics to treat the infection. It is important to take all of your antibiotics even if your eye starts to feel better.       What care is needed at home?   · Ask your doctor what you need to do when you go home. Make sure you ask questions if you do not understand what the doctor says.  · Wash your hands before touching your eyes. Gently remove your eye drainage or crusting with a  clean cloth and warm water.  · Avoid pollen if an allergy is causing your pink eye. Stay inside as much as you can with the windows closed during peak allergy seasons.  · Lubricating eye drops or allergy eye drops may help your eye feel better. Make sure to read the directions carefully. Wash your hands with care before and after you touch your eye.  · When you use eye drops, take care not to touch the bottle or dropper to your eye.  · If you wear contact lenses, you will need to stop wearing them for a short time until your symptoms go away. If your contacts are disposable, start with fresh ones after your eye gets better. If not, clean your contacts with care. Clean your contact case thoroughly or get a new one.  · Avoid sharing towels, washcloths, bedding, or personal items when you have pink eye.  · You may need to stay out of work or school for a few days.  What follow-up care is needed?   Your doctor may ask you to make visits to the office to check on your progress. Be sure to keep these visits.  What drugs may be needed?   The doctor may order drugs based on the cause of your health problem. Talk to your doctor about what drugs you need to take.   Will physical activity be limited?   Your physical activities will not be limited. Remember, this infection spreads easily from person to person. Ask your doctor when you can go back to work or school.  What problems could happen?   You may be infected again from someone in your house, workplace, or school.  What can be done to prevent this health problem?   Good hygiene can help prevent the spread of this infection.  · Wash your hands well and often, after touching your face, and after you cough or sneeze.  · Do not share eye make-up or eye drops with others.  · Do not share pillows or towels with others.  · Clean contact lenses daily. Do not sleep in them unless your eye doctor says it is OK.  When do I need to call the doctor?   · You have trouble seeing clearly  after blinking.  · Your eye is still red or has drainage after 3 days.  · You have eye pain that is getting worse.  Teach Back: Helping You Understand   The Teach Back Method helps you understand the information we are giving you. After you talk with the staff, tell them in your own words what you learned. This helps to make sure the staff has described each thing clearly. It also helps to explain things that may have been confusing. Before going home, make sure you can do these:  · I can tell you about my condition.  · I can tell you how to care for my eye.  · I can tell you what I will do if I have eye pain or blurry eyesight.  Where can I learn more?   [facility]org  http://familydoctor.org/familydoctor/en/diseases-conditions/allergic-conjunctivitis.html   [facility]ids [facility]  http://kidshealth.org/en/parents/conjunctivitis.html?ref=search&WT.ac=nht-v-engf-fj-njhrkd-och   [facility] Choices  https://www.nhs.uk/conditions/conjunctivitis/   Last Reviewed Date   2021-06-10  Consumer Information Use and Disclaimer   This information is not specific medical advice and does not replace information you receive from your health care provider. This is only a brief summary of general information. It does NOT include all information about conditions, illnesses, injuries, tests, procedures, treatments, therapies, discharge instructions or life-style choices that may apply to you. You must talk with your health care provider for complete information about your health and treatment options. This information should not be used to decide whether or not to accept your health care providers advice, instructions or recommendations. Only your health care provider has the knowledge and training to provide advice that is right for you.  Copyright   Copyright © 2021 [employer] and its affiliates and/or licensors. All rights reserved.

## 2022-01-24 ENCOUNTER — OFFICE VISIT (OUTPATIENT)
Dept: DERMATOLOGY | Facility: CLINIC | Age: 47
End: 2022-01-24
Payer: COMMERCIAL

## 2022-01-24 DIAGNOSIS — B07.9 VIRAL WARTS, UNSPECIFIED TYPE: Primary | ICD-10-CM

## 2022-01-24 PROCEDURE — 17110 PR DESTRUCTION BENIGN LESIONS UP TO 14: ICD-10-PCS | Mod: S$GLB,,, | Performed by: DERMATOLOGY

## 2022-01-24 PROCEDURE — 1159F PR MEDICATION LIST DOCUMENTED IN MEDICAL RECORD: ICD-10-PCS | Mod: CPTII,S$GLB,, | Performed by: DERMATOLOGY

## 2022-01-24 PROCEDURE — 99999 PR PBB SHADOW E&M-EST. PATIENT-LVL III: CPT | Mod: PBBFAC,,, | Performed by: DERMATOLOGY

## 2022-01-24 PROCEDURE — 1159F MED LIST DOCD IN RCRD: CPT | Mod: CPTII,S$GLB,, | Performed by: DERMATOLOGY

## 2022-01-24 PROCEDURE — 1160F RVW MEDS BY RX/DR IN RCRD: CPT | Mod: CPTII,S$GLB,, | Performed by: DERMATOLOGY

## 2022-01-24 PROCEDURE — 99999 PR PBB SHADOW E&M-EST. PATIENT-LVL III: ICD-10-PCS | Mod: PBBFAC,,, | Performed by: DERMATOLOGY

## 2022-01-24 PROCEDURE — 99499 NO LOS: ICD-10-PCS | Mod: S$GLB,,, | Performed by: DERMATOLOGY

## 2022-01-24 PROCEDURE — 17110 DESTRUCTION B9 LES UP TO 14: CPT | Mod: S$GLB,,, | Performed by: DERMATOLOGY

## 2022-01-24 PROCEDURE — 99499 UNLISTED E&M SERVICE: CPT | Mod: S$GLB,,, | Performed by: DERMATOLOGY

## 2022-01-24 PROCEDURE — 1160F PR REVIEW ALL MEDS BY PRESCRIBER/CLIN PHARMACIST DOCUMENTED: ICD-10-PCS | Mod: CPTII,S$GLB,, | Performed by: DERMATOLOGY

## 2022-01-24 NOTE — PROGRESS NOTES
Subjective:       Patient ID:  Tate Sanford is a 46 y.o. male who presents for   Chief Complaint   Patient presents with    Warts     Follow up     Warts        Review of Systems   Constitutional: Negative for fever and chills.   HENT: Negative for sore throat.    Respiratory: Negative for cough.         Objective:    Physical Exam   Constitutional: He appears well-developed and well-nourished.   Eyes: No conjunctival no injection.   Neurological: He is alert and oriented to person, place, and time.   Psychiatric: He has a normal mood and affect.   Skin:   Areas Examined (abnormalities noted in diagram):   Head / Face Inspection Performed              Diagram Legend     Erythematous scaling macule/papule c/w actinic keratosis       Vascular papule c/w angioma      Pigmented verrucoid papule/plaque c/w seborrheic keratosis      Yellow umbilicated papule c/w sebaceous hyperplasia      Irregularly shaped tan macule c/w lentigo     1-2 mm smooth white papules consistent with Milia      Movable subcutaneous cyst with punctum c/w epidermal inclusion cyst      Subcutaneous movable cyst c/w pilar cyst      Firm pink to brown papule c/w dermatofibroma      Pedunculated fleshy papule(s) c/w skin tag(s)      Evenly pigmented macule c/w junctional nevus     Mildly variegated pigmented, slightly irregular-bordered macule c/w mildly atypical nevus      Flesh colored to evenly pigmented papule c/w intradermal nevus       Pink pearly papule/plaque c/w basal cell carcinoma      Erythematous hyperkeratotic cursted plaque c/w SCC      Surgical scar with no sign of skin cancer recurrence      Open and closed comedones      Inflammatory papules and pustules      Verrucoid papule consistent consistent with wart     Erythematous eczematous patches and plaques     Dystrophic onycholytic nail with subungual debris c/w onychomycosis     Umbilicated papule    Erythematous-base heme-crusted tan verrucoid plaque consistent with  inflamed seborrheic keratosis     Erythematous Silvery Scaling Plaque c/w Psoriasis     See annotation      Assessment / Plan:        Viral warts, unspecified type  Recurrent from last visit.  Was gone for few mths.      Warts are caused by the human papillomavirus (HPV). There are over 150 types of HPV. This virus can spread between people. But you can be exposed to the virus and not get warts. Warts tend to form where skin is damaged or broken. But they can also appear elsewhere. Left untreated, warts can grow in number. They can also spread to other parts of the body.    Different treatment options were reviewed.  For small warts or recurrences, over the counter acid treatments can be used until irritation occurs or mild scabbing.    10-20% salicylic acid can be used with occlusion for significant warts daily with careful paring and debulking with breaks as needed for discomfort or sensitivity.  This can be done on a protracted basis for clearance of warts over time.    Patient can take elderberry and echinacea to boost their immune system.  Cryosurgery procedure note:    Verbal consent from the patient is obtained including, but not limited to, risk of hypopigmentation/hyperpigmentation, scar, recurrence of lesion. Liquid nitrogen cryosurgery is applied to 1 verruca with prior paring, as detailed in the physical exam, to produce a freeze injury. 2 consecutive freeze thaw cycles are applied to each verruca. The patient is aware that blisters (possibly blood blisters) may form.  Patient and or guardian to monitor this area/lesion or these areas/lesions for changes or worsening.  Patient and or guardian to contact us if any changes are noted for such.           Follow up if symptoms worsen or fail to improve.

## 2022-01-24 NOTE — PATIENT INSTRUCTIONS
Use 12-20% salicylic acid.  Take Elderberry supplements.      CRYOSURGERY      Your doctor has used a method called cryosurgery to treat your skin condition. Cryosurgery refers to the use of very cold substances to treat a variety of skin conditions such as warts, pre-skin cancers, molluscum contagiosum, sun spots, and several benign growths. The substance we use in cryosurgery is liquid nitrogen and is so cold (-195 degrees Celsius) that is burns when administered.     Following treatment in the office, the skin may immediately burn and become red. You may find the area around the lesion is affected as well. It is sometimes necessary to treat not only the lesion, but a small area of the surrounding normal skin to achieve a good response.     A blister, and even a blood filled blister, may form after treatment.   This is a normal response. If the blister is painful, it is acceptable to sterilize a needle and with rubbing alcohol and gently pop the blister. It is important that you gently wash the area with soap and warm water as the blister fluid may contain wart virus if a wart was treated. Do no remove the roof of the blister.     The area treated can take anywhere from 1-3 weeks to heal. Healing time depends on the kind skin lesion treated, the location, and how aggressively the lesion was treated. It is recommended that the areas treated are covered with Vaseline or bacitracin ointment and a band-aid. If a band-aid is not practical, just ointment applied several times per day will do. Keeping these areas moist will speed the healing time.    Treatment with liquid nitrogen can leave a scar. In dark skin, it may be a light or dark scar, in light skin it may be a white or pink scar. These will generally fade with time, but may never go away completely.     If you have any concerns after your treatment, please feel free to call the office.       [address] [facility], [place] [address]/ [phone]  [phone] FAX/ [email or web address]

## 2022-02-14 ENCOUNTER — LAB VISIT (OUTPATIENT)
Dept: PRIMARY CARE CLINIC | Facility: OTHER | Age: 47
End: 2022-02-14
Payer: COMMERCIAL

## 2022-02-14 DIAGNOSIS — Z20.822 ENCOUNTER FOR LABORATORY TESTING FOR COVID-19 VIRUS: ICD-10-CM

## 2022-02-14 PROCEDURE — U0003 INFECTIOUS AGENT DETECTION BY NUCLEIC ACID (DNA OR RNA); SEVERE ACUTE RESPIRATORY SYNDROME CORONAVIRUS 2 (SARS-COV-2) (CORONAVIRUS DISEASE [COVID-19]), AMPLIFIED PROBE TECHNIQUE, MAKING USE OF HIGH THROUGHPUT TECHNOLOGIES AS DESCRIBED BY CMS-2020-01-R: HCPCS | Performed by: INTERNAL MEDICINE

## 2022-02-15 LAB
SARS-COV-2 RNA RESP QL NAA+PROBE: NOT DETECTED
SARS-COV-2- CYCLE NUMBER: NORMAL

## 2022-03-14 ENCOUNTER — LAB VISIT (OUTPATIENT)
Dept: PRIMARY CARE CLINIC | Facility: OTHER | Age: 47
End: 2022-03-14
Payer: COMMERCIAL

## 2022-03-14 DIAGNOSIS — Z20.822 ENCOUNTER FOR LABORATORY TESTING FOR COVID-19 VIRUS: ICD-10-CM

## 2022-03-14 PROCEDURE — U0003 INFECTIOUS AGENT DETECTION BY NUCLEIC ACID (DNA OR RNA); SEVERE ACUTE RESPIRATORY SYNDROME CORONAVIRUS 2 (SARS-COV-2) (CORONAVIRUS DISEASE [COVID-19]), AMPLIFIED PROBE TECHNIQUE, MAKING USE OF HIGH THROUGHPUT TECHNOLOGIES AS DESCRIBED BY CMS-2020-01-R: HCPCS | Performed by: INTERNAL MEDICINE

## 2022-03-15 LAB
SARS-COV-2 RNA RESP QL NAA+PROBE: NOT DETECTED
SARS-COV-2- CYCLE NUMBER: NORMAL

## 2022-03-31 ENCOUNTER — OFFICE VISIT (OUTPATIENT)
Dept: FAMILY MEDICINE | Facility: CLINIC | Age: 47
End: 2022-03-31
Payer: COMMERCIAL

## 2022-03-31 VITALS
HEART RATE: 79 BPM | SYSTOLIC BLOOD PRESSURE: 136 MMHG | HEIGHT: 75 IN | WEIGHT: 237 LBS | BODY MASS INDEX: 29.47 KG/M2 | DIASTOLIC BLOOD PRESSURE: 86 MMHG | OXYGEN SATURATION: 97 %

## 2022-03-31 DIAGNOSIS — Z00.00 ROUTINE HEALTH MAINTENANCE: Primary | ICD-10-CM

## 2022-03-31 DIAGNOSIS — I10 PRIMARY HYPERTENSION: ICD-10-CM

## 2022-03-31 DIAGNOSIS — N52.9 ERECTILE DYSFUNCTION, UNSPECIFIED ERECTILE DYSFUNCTION TYPE: ICD-10-CM

## 2022-03-31 DIAGNOSIS — Z12.11 COLON CANCER SCREENING: ICD-10-CM

## 2022-03-31 PROCEDURE — 3075F PR MOST RECENT SYSTOLIC BLOOD PRESS GE 130-139MM HG: ICD-10-PCS | Mod: CPTII,S$GLB,, | Performed by: FAMILY MEDICINE

## 2022-03-31 PROCEDURE — 3008F BODY MASS INDEX DOCD: CPT | Mod: CPTII,S$GLB,, | Performed by: FAMILY MEDICINE

## 2022-03-31 PROCEDURE — 3079F DIAST BP 80-89 MM HG: CPT | Mod: CPTII,S$GLB,, | Performed by: FAMILY MEDICINE

## 2022-03-31 PROCEDURE — 3079F PR MOST RECENT DIASTOLIC BLOOD PRESSURE 80-89 MM HG: ICD-10-PCS | Mod: CPTII,S$GLB,, | Performed by: FAMILY MEDICINE

## 2022-03-31 PROCEDURE — 99396 PR PREVENTIVE VISIT,EST,40-64: ICD-10-PCS | Mod: S$GLB,,, | Performed by: FAMILY MEDICINE

## 2022-03-31 PROCEDURE — 1159F PR MEDICATION LIST DOCUMENTED IN MEDICAL RECORD: ICD-10-PCS | Mod: CPTII,S$GLB,, | Performed by: FAMILY MEDICINE

## 2022-03-31 PROCEDURE — 99999 PR PBB SHADOW E&M-EST. PATIENT-LVL III: ICD-10-PCS | Mod: PBBFAC,,, | Performed by: FAMILY MEDICINE

## 2022-03-31 PROCEDURE — 4010F PR ACE/ARB THEARPY RXD/TAKEN: ICD-10-PCS | Mod: CPTII,S$GLB,, | Performed by: FAMILY MEDICINE

## 2022-03-31 PROCEDURE — 3008F PR BODY MASS INDEX (BMI) DOCUMENTED: ICD-10-PCS | Mod: CPTII,S$GLB,, | Performed by: FAMILY MEDICINE

## 2022-03-31 PROCEDURE — 3075F SYST BP GE 130 - 139MM HG: CPT | Mod: CPTII,S$GLB,, | Performed by: FAMILY MEDICINE

## 2022-03-31 PROCEDURE — 99396 PREV VISIT EST AGE 40-64: CPT | Mod: S$GLB,,, | Performed by: FAMILY MEDICINE

## 2022-03-31 PROCEDURE — 99999 PR PBB SHADOW E&M-EST. PATIENT-LVL III: CPT | Mod: PBBFAC,,, | Performed by: FAMILY MEDICINE

## 2022-03-31 PROCEDURE — 4010F ACE/ARB THERAPY RXD/TAKEN: CPT | Mod: CPTII,S$GLB,, | Performed by: FAMILY MEDICINE

## 2022-03-31 PROCEDURE — 1159F MED LIST DOCD IN RCRD: CPT | Mod: CPTII,S$GLB,, | Performed by: FAMILY MEDICINE

## 2022-03-31 RX ORDER — LISINOPRIL AND HYDROCHLOROTHIAZIDE 12.5; 2 MG/1; MG/1
1 TABLET ORAL DAILY
Qty: 90 TABLET | Refills: 3 | Status: SHIPPED | OUTPATIENT
Start: 2022-03-31 | End: 2023-02-01

## 2022-03-31 RX ORDER — SILDENAFIL 50 MG/1
60 TABLET, FILM COATED ORAL DAILY PRN
COMMUNITY
End: 2022-03-31

## 2022-03-31 RX ORDER — SILDENAFIL CITRATE 20 MG/1
TABLET ORAL
Qty: 90 TABLET | Refills: 5 | Status: SHIPPED | OUTPATIENT
Start: 2022-03-31

## 2022-03-31 NOTE — PROGRESS NOTES
"Subjective:       Patient ID: Tate Sanford is a 46 y.o. male.    Chief Complaint: Establish Care    HPI  Came in today to establish care for routine annual exam.  Has no acute concerns at this time.  Requesting refill on medications and update lab work.  He also will be due for routine colon cancer screening.    Works as a forensic psychiatrist.    Family History   Problem Relation Age of Onset    No Known Problems Mother     Lymphoma Father     Heart disease Sister     No Known Problems Son     Breast cancer Maternal Grandmother     Melanoma Neg Hx        Current Outpatient Medications:     aspirin-acetaminophen-caffeine 250-250-65 mg (EXCEDRIN MIGRAINE) 250-250-65 mg per tablet, Take 1 tablet by mouth every 6 (six) hours as needed., Disp: , Rfl:     lisinopriL-hydrochlorothiazide (PRINZIDE,ZESTORETIC) 20-12.5 mg per tablet, Take 1 tablet by mouth once daily., Disp: 90 tablet, Rfl: 3    sildenafil (REVATIO) 20 mg Tab, 60 mg as needed for erectile dysfunction, Disp: 90 tablet, Rfl: 5    Review of Systems   Constitutional: Negative for chills and fever.   Eyes: Negative for visual disturbance.   Respiratory: Negative for cough and shortness of breath.    Cardiovascular: Negative for chest pain.   Gastrointestinal: Negative for abdominal pain.   Neurological: Negative for dizziness.       Objective:   /86   Pulse 79   Ht 6' 3" (1.905 m)   Wt 107.5 kg (237 lb)   SpO2 97%   BMI 29.62 kg/m²      Physical Exam  Vitals reviewed.   Constitutional:       General: He is not in acute distress.     Appearance: He is well-developed. He is not diaphoretic.   HENT:      Head: Normocephalic and atraumatic.      Nose: Nose normal.   Eyes:      General:         Right eye: No discharge.         Left eye: No discharge.      Conjunctiva/sclera: Conjunctivae normal.      Pupils: Pupils are equal, round, and reactive to light.   Neck:      Thyroid: No thyromegaly.   Cardiovascular:      Rate and Rhythm: " Normal rate and regular rhythm.      Heart sounds: Normal heart sounds. No murmur heard.  Pulmonary:      Effort: Pulmonary effort is normal. No respiratory distress.      Breath sounds: Normal breath sounds. No wheezing.   Abdominal:      General: There is no distension.      Palpations: Abdomen is soft.   Skin:     General: Skin is warm.      Findings: No rash.   Neurological:      Mental Status: He is alert and oriented to person, place, and time.   Psychiatric:         Behavior: Behavior normal.         Assessment & Plan     Problem List Items Addressed This Visit        Cardiac/Vascular    Hypertension    Current Assessment & Plan     Blood pressure well controlled.  Continue on same medications             Relevant Orders    Comprehensive Metabolic Panel       Renal/    Erectile dysfunction    Current Assessment & Plan     Does well with sildenafil intermittently           Relevant Medications    sildenafil (REVATIO) 20 mg Tab       Other    Routine health maintenance - Primary    Current Assessment & Plan     No concerns from history and physical exam.  Continue routine lab work done today.  Screening colonoscopy ordered.           Relevant Orders    Lipid Panel    Comprehensive Metabolic Panel    Testosterone      Other Visit Diagnoses     Colon cancer screening        Relevant Orders    Case Request Endoscopy: COLONOSCOPY (Completed)            Immunizations Administered on Date of Encounter - 3/31/2022     No immunizations on file.           No follow-ups on file.    Disclaimer:  This note may have been prepared using voice recognition software, it may have not been extensively proofed, as such there could be errors within the text such as sound alike errors.

## 2022-04-04 ENCOUNTER — PATIENT MESSAGE (OUTPATIENT)
Dept: ADMINISTRATIVE | Facility: HOSPITAL | Age: 47
End: 2022-04-04
Payer: COMMERCIAL

## 2022-04-04 PROBLEM — N52.9 ERECTILE DYSFUNCTION: Status: ACTIVE | Noted: 2022-04-04

## 2022-04-04 PROBLEM — Z00.00 ROUTINE HEALTH MAINTENANCE: Status: ACTIVE | Noted: 2022-04-04

## 2022-04-04 NOTE — ASSESSMENT & PLAN NOTE
No concerns from history and physical exam.  Continue routine lab work done today.  Screening colonoscopy ordered.

## 2022-04-07 ENCOUNTER — LAB VISIT (OUTPATIENT)
Dept: LAB | Facility: HOSPITAL | Age: 47
End: 2022-04-07
Attending: FAMILY MEDICINE
Payer: COMMERCIAL

## 2022-04-07 DIAGNOSIS — Z00.00 ROUTINE HEALTH MAINTENANCE: ICD-10-CM

## 2022-04-07 DIAGNOSIS — I10 PRIMARY HYPERTENSION: ICD-10-CM

## 2022-04-07 LAB
ALBUMIN SERPL BCP-MCNC: 3.9 G/DL (ref 3.5–5.2)
ALP SERPL-CCNC: 65 U/L (ref 55–135)
ALT SERPL W/O P-5'-P-CCNC: 11 U/L (ref 10–44)
ANION GAP SERPL CALC-SCNC: 10 MMOL/L (ref 8–16)
AST SERPL-CCNC: 15 U/L (ref 10–40)
BILIRUB SERPL-MCNC: 0.5 MG/DL (ref 0.1–1)
BUN SERPL-MCNC: 19 MG/DL (ref 6–20)
CALCIUM SERPL-MCNC: 9.3 MG/DL (ref 8.7–10.5)
CHLORIDE SERPL-SCNC: 103 MMOL/L (ref 95–110)
CHOLEST SERPL-MCNC: 218 MG/DL (ref 120–199)
CHOLEST/HDLC SERPL: 4.6 {RATIO} (ref 2–5)
CO2 SERPL-SCNC: 26 MMOL/L (ref 23–29)
CREAT SERPL-MCNC: 1.1 MG/DL (ref 0.5–1.4)
EST. GFR  (AFRICAN AMERICAN): >60 ML/MIN/1.73 M^2
EST. GFR  (NON AFRICAN AMERICAN): >60 ML/MIN/1.73 M^2
GLUCOSE SERPL-MCNC: 88 MG/DL (ref 70–110)
HDLC SERPL-MCNC: 47 MG/DL (ref 40–75)
HDLC SERPL: 21.6 % (ref 20–50)
LDLC SERPL CALC-MCNC: 151.8 MG/DL (ref 63–159)
NONHDLC SERPL-MCNC: 171 MG/DL
POTASSIUM SERPL-SCNC: 4.4 MMOL/L (ref 3.5–5.1)
PROT SERPL-MCNC: 7.2 G/DL (ref 6–8.4)
SODIUM SERPL-SCNC: 139 MMOL/L (ref 136–145)
TESTOST SERPL-MCNC: 760 NG/DL (ref 304–1227)
TRIGL SERPL-MCNC: 96 MG/DL (ref 30–150)

## 2022-04-07 PROCEDURE — 80053 COMPREHEN METABOLIC PANEL: CPT | Performed by: FAMILY MEDICINE

## 2022-04-07 PROCEDURE — 80061 LIPID PANEL: CPT | Performed by: FAMILY MEDICINE

## 2022-04-07 PROCEDURE — 36415 COLL VENOUS BLD VENIPUNCTURE: CPT | Mod: PO | Performed by: FAMILY MEDICINE

## 2022-04-07 PROCEDURE — 84403 ASSAY OF TOTAL TESTOSTERONE: CPT | Performed by: FAMILY MEDICINE

## 2022-04-18 ENCOUNTER — LAB VISIT (OUTPATIENT)
Dept: PRIMARY CARE CLINIC | Facility: OTHER | Age: 47
End: 2022-04-18
Payer: COMMERCIAL

## 2022-04-18 DIAGNOSIS — Z20.822 ENCOUNTER FOR LABORATORY TESTING FOR COVID-19 VIRUS: ICD-10-CM

## 2022-04-18 LAB
SARS-COV-2 RNA RESP QL NAA+PROBE: NOT DETECTED
SARS-COV-2- CYCLE NUMBER: NORMAL

## 2022-04-18 PROCEDURE — U0003 INFECTIOUS AGENT DETECTION BY NUCLEIC ACID (DNA OR RNA); SEVERE ACUTE RESPIRATORY SYNDROME CORONAVIRUS 2 (SARS-COV-2) (CORONAVIRUS DISEASE [COVID-19]), AMPLIFIED PROBE TECHNIQUE, MAKING USE OF HIGH THROUGHPUT TECHNOLOGIES AS DESCRIBED BY CMS-2020-01-R: HCPCS | Performed by: INTERNAL MEDICINE

## 2022-05-23 ENCOUNTER — LAB VISIT (OUTPATIENT)
Dept: PRIMARY CARE CLINIC | Facility: OTHER | Age: 47
End: 2022-05-23
Payer: COMMERCIAL

## 2022-05-23 DIAGNOSIS — Z20.822 ENCOUNTER FOR LABORATORY TESTING FOR COVID-19 VIRUS: ICD-10-CM

## 2022-05-23 PROCEDURE — U0003 INFECTIOUS AGENT DETECTION BY NUCLEIC ACID (DNA OR RNA); SEVERE ACUTE RESPIRATORY SYNDROME CORONAVIRUS 2 (SARS-COV-2) (CORONAVIRUS DISEASE [COVID-19]), AMPLIFIED PROBE TECHNIQUE, MAKING USE OF HIGH THROUGHPUT TECHNOLOGIES AS DESCRIBED BY CMS-2020-01-R: HCPCS | Performed by: INTERNAL MEDICINE

## 2022-05-24 LAB
SARS-COV-2 RNA RESP QL NAA+PROBE: NOT DETECTED
SARS-COV-2- CYCLE NUMBER: NORMAL

## 2022-06-08 ENCOUNTER — PATIENT MESSAGE (OUTPATIENT)
Dept: ADMINISTRATIVE | Facility: HOSPITAL | Age: 47
End: 2022-06-08
Payer: COMMERCIAL

## 2022-06-13 ENCOUNTER — LAB VISIT (OUTPATIENT)
Dept: PRIMARY CARE CLINIC | Facility: OTHER | Age: 47
End: 2022-06-13
Attending: INTERNAL MEDICINE
Payer: COMMERCIAL

## 2022-06-13 DIAGNOSIS — Z20.822 ENCOUNTER FOR LABORATORY TESTING FOR COVID-19 VIRUS: ICD-10-CM

## 2022-06-13 LAB
SARS-COV-2 RNA RESP QL NAA+PROBE: NOT DETECTED
SARS-COV-2- CYCLE NUMBER: NORMAL

## 2022-06-13 PROCEDURE — U0003 INFECTIOUS AGENT DETECTION BY NUCLEIC ACID (DNA OR RNA); SEVERE ACUTE RESPIRATORY SYNDROME CORONAVIRUS 2 (SARS-COV-2) (CORONAVIRUS DISEASE [COVID-19]), AMPLIFIED PROBE TECHNIQUE, MAKING USE OF HIGH THROUGHPUT TECHNOLOGIES AS DESCRIBED BY CMS-2020-01-R: HCPCS | Performed by: INTERNAL MEDICINE

## 2022-07-04 PROBLEM — Z00.00 ROUTINE HEALTH MAINTENANCE: Status: RESOLVED | Noted: 2022-04-04 | Resolved: 2022-07-04

## 2022-10-16 DIAGNOSIS — Z12.11 COLON CANCER SCREENING: Primary | ICD-10-CM

## 2022-10-25 ENCOUNTER — PATIENT MESSAGE (OUTPATIENT)
Dept: INTERNAL MEDICINE | Facility: CLINIC | Age: 47
End: 2022-10-25
Payer: COMMERCIAL

## 2022-10-25 ENCOUNTER — PATIENT OUTREACH (OUTPATIENT)
Dept: INTERNAL MEDICINE | Facility: CLINIC | Age: 47
End: 2022-10-25
Payer: COMMERCIAL

## 2022-11-01 ENCOUNTER — PATIENT MESSAGE (OUTPATIENT)
Dept: ADMINISTRATIVE | Facility: HOSPITAL | Age: 47
End: 2022-11-01
Payer: COMMERCIAL

## 2022-11-01 ENCOUNTER — PATIENT OUTREACH (OUTPATIENT)
Dept: ADMINISTRATIVE | Facility: HOSPITAL | Age: 47
End: 2022-11-01
Payer: COMMERCIAL

## 2022-11-22 ENCOUNTER — PATIENT MESSAGE (OUTPATIENT)
Dept: INTERNAL MEDICINE | Facility: CLINIC | Age: 47
End: 2022-11-22
Payer: COMMERCIAL

## 2023-05-08 ENCOUNTER — LAB VISIT (OUTPATIENT)
Dept: LAB | Facility: HOSPITAL | Age: 48
End: 2023-05-08
Attending: NURSE PRACTITIONER
Payer: COMMERCIAL

## 2023-05-08 ENCOUNTER — OFFICE VISIT (OUTPATIENT)
Dept: FAMILY MEDICINE | Facility: CLINIC | Age: 48
End: 2023-05-08
Payer: COMMERCIAL

## 2023-05-08 VITALS
HEART RATE: 70 BPM | DIASTOLIC BLOOD PRESSURE: 68 MMHG | SYSTOLIC BLOOD PRESSURE: 126 MMHG | HEIGHT: 75 IN | BODY MASS INDEX: 29.22 KG/M2 | WEIGHT: 235 LBS

## 2023-05-08 DIAGNOSIS — Z13.1 SCREENING FOR DIABETES MELLITUS (DM): ICD-10-CM

## 2023-05-08 DIAGNOSIS — I10 PRIMARY HYPERTENSION: ICD-10-CM

## 2023-05-08 DIAGNOSIS — Z00.00 ROUTINE HEALTH MAINTENANCE: Primary | ICD-10-CM

## 2023-05-08 DIAGNOSIS — Z12.11 SCREENING FOR COLON CANCER: ICD-10-CM

## 2023-05-08 DIAGNOSIS — Z00.00 ROUTINE HEALTH MAINTENANCE: ICD-10-CM

## 2023-05-08 LAB
ALBUMIN SERPL BCP-MCNC: 3.9 G/DL (ref 3.5–5.2)
ALP SERPL-CCNC: 54 U/L (ref 55–135)
ALT SERPL W/O P-5'-P-CCNC: 15 U/L (ref 10–44)
ANION GAP SERPL CALC-SCNC: 10 MMOL/L (ref 8–16)
AST SERPL-CCNC: 14 U/L (ref 10–40)
BASOPHILS # BLD AUTO: 0.06 K/UL (ref 0–0.2)
BASOPHILS NFR BLD: 1.1 % (ref 0–1.9)
BILIRUB SERPL-MCNC: 0.7 MG/DL (ref 0.1–1)
BUN SERPL-MCNC: 15 MG/DL (ref 6–20)
CALCIUM SERPL-MCNC: 9.1 MG/DL (ref 8.7–10.5)
CHLORIDE SERPL-SCNC: 103 MMOL/L (ref 95–110)
CHOLEST SERPL-MCNC: 194 MG/DL (ref 120–199)
CHOLEST/HDLC SERPL: 4.4 {RATIO} (ref 2–5)
CO2 SERPL-SCNC: 25 MMOL/L (ref 23–29)
CREAT SERPL-MCNC: 1.2 MG/DL (ref 0.5–1.4)
DIFFERENTIAL METHOD: NORMAL
EOSINOPHIL # BLD AUTO: 0.4 K/UL (ref 0–0.5)
EOSINOPHIL NFR BLD: 7.7 % (ref 0–8)
ERYTHROCYTE [DISTWIDTH] IN BLOOD BY AUTOMATED COUNT: 12.3 % (ref 11.5–14.5)
EST. GFR  (NO RACE VARIABLE): >60 ML/MIN/1.73 M^2
ESTIMATED AVG GLUCOSE: 97 MG/DL (ref 68–131)
GLUCOSE SERPL-MCNC: 102 MG/DL (ref 70–110)
HBA1C MFR BLD: 5 % (ref 4–5.6)
HCT VFR BLD AUTO: 44.5 % (ref 40–54)
HDLC SERPL-MCNC: 44 MG/DL (ref 40–75)
HDLC SERPL: 22.7 % (ref 20–50)
HGB BLD-MCNC: 15.2 G/DL (ref 14–18)
IMM GRANULOCYTES # BLD AUTO: 0.02 K/UL (ref 0–0.04)
IMM GRANULOCYTES NFR BLD AUTO: 0.4 % (ref 0–0.5)
LDLC SERPL CALC-MCNC: 133 MG/DL (ref 63–159)
LYMPHOCYTES # BLD AUTO: 1.8 K/UL (ref 1–4.8)
LYMPHOCYTES NFR BLD: 33.9 % (ref 18–48)
MCH RBC QN AUTO: 30 PG (ref 27–31)
MCHC RBC AUTO-ENTMCNC: 34.2 G/DL (ref 32–36)
MCV RBC AUTO: 88 FL (ref 82–98)
MONOCYTES # BLD AUTO: 0.4 K/UL (ref 0.3–1)
MONOCYTES NFR BLD: 7.2 % (ref 4–15)
NEUTROPHILS # BLD AUTO: 2.7 K/UL (ref 1.8–7.7)
NEUTROPHILS NFR BLD: 49.7 % (ref 38–73)
NONHDLC SERPL-MCNC: 150 MG/DL
NRBC BLD-RTO: 0 /100 WBC
PLATELET # BLD AUTO: 213 K/UL (ref 150–450)
PMV BLD AUTO: 10.8 FL (ref 9.2–12.9)
POTASSIUM SERPL-SCNC: 4.2 MMOL/L (ref 3.5–5.1)
PROT SERPL-MCNC: 6.7 G/DL (ref 6–8.4)
RBC # BLD AUTO: 5.07 M/UL (ref 4.6–6.2)
SODIUM SERPL-SCNC: 138 MMOL/L (ref 136–145)
TRIGL SERPL-MCNC: 85 MG/DL (ref 30–150)
TSH SERPL DL<=0.005 MIU/L-ACNC: 1.96 UIU/ML (ref 0.4–4)
WBC # BLD AUTO: 5.43 K/UL (ref 3.9–12.7)

## 2023-05-08 PROCEDURE — 3078F PR MOST RECENT DIASTOLIC BLOOD PRESSURE < 80 MM HG: ICD-10-PCS | Mod: CPTII,S$GLB,, | Performed by: NURSE PRACTITIONER

## 2023-05-08 PROCEDURE — 3008F BODY MASS INDEX DOCD: CPT | Mod: CPTII,S$GLB,, | Performed by: NURSE PRACTITIONER

## 2023-05-08 PROCEDURE — 84443 ASSAY THYROID STIM HORMONE: CPT | Performed by: NURSE PRACTITIONER

## 2023-05-08 PROCEDURE — 80061 LIPID PANEL: CPT | Performed by: NURSE PRACTITIONER

## 2023-05-08 PROCEDURE — 99396 PREV VISIT EST AGE 40-64: CPT | Mod: S$GLB,,, | Performed by: NURSE PRACTITIONER

## 2023-05-08 PROCEDURE — 4010F PR ACE/ARB THEARPY RXD/TAKEN: ICD-10-PCS | Mod: CPTII,S$GLB,, | Performed by: NURSE PRACTITIONER

## 2023-05-08 PROCEDURE — 36415 COLL VENOUS BLD VENIPUNCTURE: CPT | Mod: PO | Performed by: NURSE PRACTITIONER

## 2023-05-08 PROCEDURE — 1159F PR MEDICATION LIST DOCUMENTED IN MEDICAL RECORD: ICD-10-PCS | Mod: CPTII,S$GLB,, | Performed by: NURSE PRACTITIONER

## 2023-05-08 PROCEDURE — 83036 HEMOGLOBIN GLYCOSYLATED A1C: CPT | Performed by: NURSE PRACTITIONER

## 2023-05-08 PROCEDURE — 99999 PR PBB SHADOW E&M-EST. PATIENT-LVL III: CPT | Mod: PBBFAC,,, | Performed by: NURSE PRACTITIONER

## 2023-05-08 PROCEDURE — 1160F PR REVIEW ALL MEDS BY PRESCRIBER/CLIN PHARMACIST DOCUMENTED: ICD-10-PCS | Mod: CPTII,S$GLB,, | Performed by: NURSE PRACTITIONER

## 2023-05-08 PROCEDURE — 80053 COMPREHEN METABOLIC PANEL: CPT | Performed by: NURSE PRACTITIONER

## 2023-05-08 PROCEDURE — 1159F MED LIST DOCD IN RCRD: CPT | Mod: CPTII,S$GLB,, | Performed by: NURSE PRACTITIONER

## 2023-05-08 PROCEDURE — 3074F PR MOST RECENT SYSTOLIC BLOOD PRESSURE < 130 MM HG: ICD-10-PCS | Mod: CPTII,S$GLB,, | Performed by: NURSE PRACTITIONER

## 2023-05-08 PROCEDURE — 3074F SYST BP LT 130 MM HG: CPT | Mod: CPTII,S$GLB,, | Performed by: NURSE PRACTITIONER

## 2023-05-08 PROCEDURE — 1160F RVW MEDS BY RX/DR IN RCRD: CPT | Mod: CPTII,S$GLB,, | Performed by: NURSE PRACTITIONER

## 2023-05-08 PROCEDURE — 99396 PR PREVENTIVE VISIT,EST,40-64: ICD-10-PCS | Mod: S$GLB,,, | Performed by: NURSE PRACTITIONER

## 2023-05-08 PROCEDURE — 4010F ACE/ARB THERAPY RXD/TAKEN: CPT | Mod: CPTII,S$GLB,, | Performed by: NURSE PRACTITIONER

## 2023-05-08 PROCEDURE — 85025 COMPLETE CBC W/AUTO DIFF WBC: CPT | Performed by: NURSE PRACTITIONER

## 2023-05-08 PROCEDURE — 3078F DIAST BP <80 MM HG: CPT | Mod: CPTII,S$GLB,, | Performed by: NURSE PRACTITIONER

## 2023-05-08 PROCEDURE — 3008F PR BODY MASS INDEX (BMI) DOCUMENTED: ICD-10-PCS | Mod: CPTII,S$GLB,, | Performed by: NURSE PRACTITIONER

## 2023-05-08 PROCEDURE — 99999 PR PBB SHADOW E&M-EST. PATIENT-LVL III: ICD-10-PCS | Mod: PBBFAC,,, | Performed by: NURSE PRACTITIONER

## 2023-05-08 NOTE — PROGRESS NOTES
Subjective:       Patient ID: Tate Sanford is a 47 y.o. male.    Chief Complaint: Annual Exam  Tate Sanford presents today for routine annual exam. Last seen in 3/31/2022 by PCP, KARLA Cedeno DO. This is his first visit with me.     With regards to hypertension:  Current medication regimen: lisinopril-hctz 20-12.5 mg  Compliance: yes   Home BP monitoring: no  Low sodium diet: n/a  Exercise: n/a  Denies cp, palpitations, sob, headaches, dizziness, vision changes, changes in urinary or bowel habits.     Patient Active Problem List   Diagnosis    Hypertension    [place] cardiac risk <10% in next 10 years    LOLITA on CPAP    Overweight (BMI 25.0-29.9)    Elevated serum cholesterol    Erectile dysfunction       Current Outpatient Medications:     aspirin-acetaminophen-caffeine 250-250-65 mg (EXCEDRIN MIGRAINE) 250-250-65 mg per tablet, Take 1 tablet by mouth every 6 (six) hours as needed., Disp: , Rfl:     lisinopriL-hydrochlorothiazide (PRINZIDE,ZESTORETIC) 20-12.5 mg per tablet, TAKE 1 TABLET BY MOUTH ONCE  DAILY, Disp: 90 tablet, Rfl: 3    sildenafil (REVATIO) 20 mg Tab, 60 mg as needed for erectile dysfunction, Disp: 90 tablet, Rfl: 5    The following portions of the patient's history were reviewed and updated as appropriate: allergies, past family history, past medical history, past social history and past surgical history.    Review of Systems   Constitutional:  Negative for fatigue, fever and unexpected weight change.   HENT:  Negative for ear pain, hearing loss, rhinorrhea, sneezing and sore throat.    Eyes:  Negative for visual disturbance.   Respiratory:  Negative for cough, shortness of breath and wheezing.    Cardiovascular:  Negative for chest pain and palpitations.   Gastrointestinal:  Negative for abdominal pain, blood in stool, constipation, diarrhea, nausea and vomiting.   Genitourinary:  Negative for difficulty urinating, frequency and hematuria.   Musculoskeletal:  Negative for  "arthralgias and myalgias.   Neurological:  Negative for dizziness, tremors and headaches.   Psychiatric/Behavioral:  Negative for dysphoric mood and sleep disturbance. The patient is not nervous/anxious.      Objective:      /68 (BP Location: Left arm)   Pulse 70   Ht 6' 3" (1.905 m)   Wt 106.6 kg (235 lb)   BMI 29.37 kg/m²     Physical Exam  Constitutional:       General: He is not in acute distress.     Appearance: Normal appearance.   HENT:      Head: Normocephalic and atraumatic.      Right Ear: Tympanic membrane normal.      Left Ear: Tympanic membrane normal.      Nose: Nose normal.      Mouth/Throat:      Mouth: Mucous membranes are moist.      Pharynx: Oropharynx is clear.   Eyes:      Extraocular Movements: Extraocular movements intact.      Pupils: Pupils are equal, round, and reactive to light.   Cardiovascular:      Rate and Rhythm: Normal rate and regular rhythm.      Pulses: Normal pulses.      Heart sounds: Normal heart sounds.   Pulmonary:      Effort: Pulmonary effort is normal.      Breath sounds: Normal breath sounds.   Abdominal:      Palpations: Abdomen is soft.   Musculoskeletal:         General: No swelling or deformity. Normal range of motion.      Cervical back: Normal range of motion and neck supple.   Skin:     General: Skin is warm and dry.      Capillary Refill: Capillary refill takes less than 2 seconds.   Neurological:      General: No focal deficit present.      Mental Status: He is alert and oriented to person, place, and time.      Coordination: Coordination normal.      Gait: Gait normal.   Psychiatric:         Mood and Affect: Mood normal.         Behavior: Behavior normal.       Assessment:       1. Routine health maintenance    2. Screening for colon cancer    3. Primary hypertension    4. Screening for diabetes mellitus (DM)        Plan:   Tate was seen today for annual exam.    Diagnoses and all orders for this visit:    Routine health maintenance    Screening " for colon cancer  -     Cologuard Screening (Multitarget Stool DNA); Future  -     Cologuard Screening (Multitarget Stool DNA)    Primary hypertension  -     Lipid Panel; Future  -     Comprehensive Metabolic Panel; Future  -     CBC Auto Differential; Future  -     TSH; Future    Screening for diabetes mellitus (DM)  -     HEMOGLOBIN A1C; Future

## 2023-06-13 LAB — NONINV COLON CA DNA+OCC BLD SCRN STL QL: NEGATIVE

## 2023-08-28 ENCOUNTER — OFFICE VISIT (OUTPATIENT)
Dept: DERMATOLOGY | Facility: CLINIC | Age: 48
End: 2023-08-28
Payer: COMMERCIAL

## 2023-08-28 DIAGNOSIS — B07.9 VERRUCA VULGARIS: ICD-10-CM

## 2023-08-28 DIAGNOSIS — D22.9 MULTIPLE BENIGN NEVI: ICD-10-CM

## 2023-08-28 DIAGNOSIS — D48.5 NEOPLASM OF UNCERTAIN BEHAVIOR OF SKIN: Primary | ICD-10-CM

## 2023-08-28 PROCEDURE — 99212 PR OFFICE/OUTPT VISIT, EST, LEVL II, 10-19 MIN: ICD-10-PCS | Mod: 25,S$GLB,, | Performed by: DERMATOLOGY

## 2023-08-28 PROCEDURE — 88342 IMHCHEM/IMCYTCHM 1ST ANTB: CPT | Mod: 26,,, | Performed by: PATHOLOGY

## 2023-08-28 PROCEDURE — 4010F ACE/ARB THERAPY RXD/TAKEN: CPT | Mod: CPTII,S$GLB,, | Performed by: DERMATOLOGY

## 2023-08-28 PROCEDURE — 1160F RVW MEDS BY RX/DR IN RCRD: CPT | Mod: CPTII,S$GLB,, | Performed by: DERMATOLOGY

## 2023-08-28 PROCEDURE — 88305 TISSUE EXAM BY PATHOLOGIST: CPT | Mod: 26,,, | Performed by: PATHOLOGY

## 2023-08-28 PROCEDURE — 99212 OFFICE O/P EST SF 10 MIN: CPT | Mod: 25,S$GLB,, | Performed by: DERMATOLOGY

## 2023-08-28 PROCEDURE — 1160F PR REVIEW ALL MEDS BY PRESCRIBER/CLIN PHARMACIST DOCUMENTED: ICD-10-PCS | Mod: CPTII,S$GLB,, | Performed by: DERMATOLOGY

## 2023-08-28 PROCEDURE — 3044F HG A1C LEVEL LT 7.0%: CPT | Mod: CPTII,S$GLB,, | Performed by: DERMATOLOGY

## 2023-08-28 PROCEDURE — 3044F PR MOST RECENT HEMOGLOBIN A1C LEVEL <7.0%: ICD-10-PCS | Mod: CPTII,S$GLB,, | Performed by: DERMATOLOGY

## 2023-08-28 PROCEDURE — 1159F MED LIST DOCD IN RCRD: CPT | Mod: CPTII,S$GLB,, | Performed by: DERMATOLOGY

## 2023-08-28 PROCEDURE — 17110 DESTRUCTION B9 LES UP TO 14: CPT | Mod: S$GLB,,, | Performed by: DERMATOLOGY

## 2023-08-28 PROCEDURE — 88342 CHG IMMUNOCYTOCHEMISTRY: ICD-10-PCS | Mod: 26,,, | Performed by: PATHOLOGY

## 2023-08-28 PROCEDURE — 88305 TISSUE EXAM BY PATHOLOGIST: CPT | Performed by: PATHOLOGY

## 2023-08-28 PROCEDURE — 11102 PR TANGENTIAL BIOPSY, SKIN, SINGLE LESION: ICD-10-PCS | Mod: XS,S$GLB,, | Performed by: DERMATOLOGY

## 2023-08-28 PROCEDURE — 17110 PR DESTRUCTION BENIGN LESIONS UP TO 14: ICD-10-PCS | Mod: S$GLB,,, | Performed by: DERMATOLOGY

## 2023-08-28 PROCEDURE — 4010F PR ACE/ARB THEARPY RXD/TAKEN: ICD-10-PCS | Mod: CPTII,S$GLB,, | Performed by: DERMATOLOGY

## 2023-08-28 PROCEDURE — 1159F PR MEDICATION LIST DOCUMENTED IN MEDICAL RECORD: ICD-10-PCS | Mod: CPTII,S$GLB,, | Performed by: DERMATOLOGY

## 2023-08-28 PROCEDURE — 11102 TANGNTL BX SKIN SINGLE LES: CPT | Mod: XS,S$GLB,, | Performed by: DERMATOLOGY

## 2023-08-28 PROCEDURE — 88305 TISSUE EXAM BY PATHOLOGIST: ICD-10-PCS | Mod: 26,,, | Performed by: PATHOLOGY

## 2023-08-28 PROCEDURE — 88342 IMHCHEM/IMCYTCHM 1ST ANTB: CPT | Performed by: PATHOLOGY

## 2023-08-28 NOTE — PROGRESS NOTES
Patient Information  Name: Tate Sanford  : 1975  MRN: [id number]     Referring Physician:  No ref. provider found   Primary Care Physician:  Cliff Cedeno DO   Date of Visit: 2023      Subjective:     History of Present lllness:    Tate Sanford is a 48 y.o. male who presents with a chief complaint of dark spot and wart on L thumb.    Location: L upper back  Duration: unsure  Signs/Symptoms: dark spot  Relieving factors/Prior treatments: none    Location: L thumb  Duration: unsure  Signs/Symptoms: wart  Relieving factors/Prior treatments: none    Patient was last seen: Visit date not found.  Prior notes by myself reviewed.   Clinical documentation obtained by nursing staff reviewed.    Review of Systems    Objective:   Physical Exam   Constitutional: He appears well-developed and well-nourished. No distress.   Neurological: He is alert and oriented to person, place, and time. He is not disoriented.   Psychiatric: He has a normal mood and affect.   Skin:   Areas Examined (abnormalities noted in diagram):   Chest / Axilla Inspection Performed  Abdomen Inspection Performed  Back Inspection Performed  RUE Inspected  LUE Inspection Performed                Diagram Legend     Erythematous scaling macule/papule c/w actinic keratosis       Vascular papule c/w angioma      Pigmented verrucoid papule/plaque c/w seborrheic keratosis      Yellow umbilicated papule c/w sebaceous hyperplasia      Irregularly shaped tan macule c/w lentigo     1-2 mm smooth white papules consistent with Milia      Movable subcutaneous cyst with punctum c/w epidermal inclusion cyst      Subcutaneous movable cyst c/w pilar cyst      Firm pink to brown papule c/w dermatofibroma      Pedunculated fleshy papule(s) c/w skin tag(s)      Evenly pigmented macule c/w junctional nevus     Mildly variegated pigmented, slightly irregular-bordered macule c/w mildly atypical nevus      Flesh colored to evenly pigmented  papule c/w intradermal nevus       Pink pearly papule/plaque c/w basal cell carcinoma      Erythematous hyperkeratotic cursted plaque c/w SCC      Surgical scar with no sign of skin cancer recurrence      Open and closed comedones      Inflammatory papules and pustules      Verrucoid papule consistent consistent with wart     Erythematous eczematous patches and plaques     Dystrophic onycholytic nail with subungual debris c/w onychomycosis     Umbilicated papule    Erythematous-base heme-crusted tan verrucoid plaque consistent with inflamed seborrheic keratosis     Erythematous Silvery Scaling Plaque c/w Psoriasis     See annotation          [] Data reviewed  [] Prior external notes reviewed  [] Independent review of test  [] Management discussed with another provider  [] Independent historian    Assessment / Plan:      Pathology Orders:       Normal Orders This Visit    Specimen to Pathology, Dermatology     Comments:    Number of Specimens:->1  ------------------------->-------------------------  Spec 1 Procedure:->Biopsy  Spec 1 Clinical Impression:->r/o dysplastic nevus  Spec 1 Source:->left upper back    Questions:    Procedure Type: Dermatology and skin neoplasms    Number of Specimens: 1    ------------------------: -------------------------    Spec 1 Procedure: Biopsy    Spec 1 Clinical Impression: r/o dysplastic nevus    Spec 1 Source: left upper back    Release to patient:           Neoplasm of uncertain behavior of skin  -     Specimen to Pathology, Dermatology    Shave biopsy procedure note:  Risk, benefits, and alternatives of biopsy are discussed with the patient, including risk of infection, scar, recurrence, and need for additional treatment of site. The patient agrees to the procedure by verbal consent. The area is marked and prepped with alcohol.  Approximately 1 mL of lidocaine 1% with epinephrine is used for local anesthesia. A sharp blade is used to remove the lesion. The specimen is sent for  pathology. Hemostasis is obtained with aluminum chloride and/or monopolar hyfrecation if needed. The area is then dressed and bandaged. The patient tolerated the procedure well without adverse event. Written instructions on wound care were given and were reviewed with the patient, who is to call for any signs of bleeding or infection. The patient will be notified of the pathology results.    Verruca vulgaris  Cryosurgery procedure note:  Risk, benefits, and alternatives of cryosurgery are discussed with the patient, including but not limited to the risks of hypopigmentation, hyperpigmentation, scar, infection, recurrence of lesion(s), development of new lesion(s), and need for additional treatment of the lesion(s). Verbal consent obtained from patient. Liquid nitrogen cryosurgery applied to 1 lesion(s) to produce a freeze injury. Counseled patient that blisters may form, and instructed patient on wound care with gentle cleansing and use of Vaseline ointment to keep moist until healed. Handout was provided, and patient was instructed to return to clinic in 1-2 months if lesions do not completely resolve.    Continue salicylic acid for at least another 2 wks once healed.    Multiple benign nevi  Multiple benign-appearing nevi present on exam today. Reassurance provided. Counseled patient to periodically examine moles and return to clinic if any changes in size, shape, or color are noted or if it becomes symptomatic (bleeding, itching, pain, etc).  Recommend using a broad-spectrum, water-resistant sunscreen with SPF of 30 or higher--reapply every 2 hours. Seek shade, wear sun-protective clothing, and perform regular skin self-exams.      Follow up dependent on pathology results.      Chaya Yadav MD, FAAD  [facility]

## 2023-08-28 NOTE — PATIENT INSTRUCTIONS
Biopsy Wound Care Instructions    Leave the bandage on for 24 hours without getting it wet.   Clean the area once a day with a gentle soap and water, then pat dry and apply Vaseline and a bandaid.  The site should be kept moist with Vaseline at all times to improve healing. Reapply a thick coating as needed. Do not let the site air out or form a scab, as this will delay healing and worsen scarring.  If any bleeding or oozing occurs once you return home, apply firm pressure to the area for 30 minutes straight without peeking. If bleeding continues, call the office immediately.  Please message us via [facility], call us at [phone], or return to the office at any sign of increasing redness, swelling, tenderness, pain, heat, yellow drainage/discharge, or continued bleeding.      Receiving Your Pathology Results    Your pathology results will be released to you on [facility] at the same time that Dr. Yadav receives them.   Dr. Yadav will then message you with her interpretation of the results and/or with the plan going forward.  If you do not use [facility] or if your pathology results require more of an explanation, you will receive your results via a phone call.  If 2 weeks go by and you have not received your results, please message us via [facility] or call us at [phone] to inform us.         CRYOSURGERY      Your doctor has used a method called cryosurgery to treat your skin condition. Cryosurgery refers to the use of very cold substances to treat a variety of skin conditions such as warts, precancerous skin lesions, molluscum contagiosum, sun spots, and several benign growths. The substance we use in cryosurgery is liquid nitrogen and is so cold (-195 degrees Celsius) that it burns when administered.     Following treatment in the office, the skin may immediately itch or burn and become red. You may find the area around the lesion is affected as well. It is sometimes necessary to treat not only the  lesion, but also a small area of the surrounding normal skin to achieve a good response.     A blister, and even a blood-filled blister, may form after treatment.   This is a normal response. If the blister is painful, it is acceptable to sterilize a needle with rubbing alcohol and gently pop the blister. It is important that you gently wash the area with soap and warm water as the blister fluid may contain wart virus if a wart was treated. Do not remove the roof of the blister.     The area treated can take anywhere from 1-3 weeks to heal. Healing time depends on the kind of skin lesion treated, the location, and how aggressively the lesion was treated. It is recommended that the areas treated are covered with Vaseline and a bandaid to improve healing. If a bandaid is not practical, Vaseline applied several times per day will do. Keeping these areas moist will speed the healing time.    Treatment with liquid nitrogen can leave a scar. In dark skin, it may be a light or dark scar; in light skin it may be a white or pink scar. These will generally fade with time, but may never go away completely.     If you have any concerns after your treatment, please message us via [facility] or call us at [phone].

## 2023-09-05 LAB
FINAL PATHOLOGIC DIAGNOSIS: NORMAL
GROSS: NORMAL
Lab: NORMAL
MICROSCOPIC EXAM: NORMAL

## 2023-09-05 NOTE — PROGRESS NOTES
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

## 2023-12-18 NOTE — TELEPHONE ENCOUNTER
Proceed with scheduling pt with Ann.     Check with Armond Espinal51 if it has been approved.     Then, provide pt with details when you have them.    upright (90 degrees) upright (90 degrees)

## 2024-03-25 RX ORDER — LISINOPRIL AND HYDROCHLOROTHIAZIDE 12.5; 2 MG/1; MG/1
1 TABLET ORAL DAILY
Qty: 90 TABLET | Refills: 3 | Status: SHIPPED | OUTPATIENT
Start: 2024-03-25

## 2024-03-25 NOTE — TELEPHONE ENCOUNTER
Care Due:                  Date            Visit Type   Department     Provider  --------------------------------------------------------------------------------    Last Visit: None Found      None         None Found  Next Visit: None Scheduled  None         None Found                                                            Last  Test          Frequency    Reason                     Performed    Due Date  --------------------------------------------------------------------------------    Office Visit  15 months..  lisinopriL-hydrochlorothi  Not Found    Overdue                             azide....................    CMP.........  12 months..  lisinopriL-hydrochlorothi  05- 05-                             azide....................    [employer] Embedded Care Due Messages. Reference number: [id number].   3/24/2024 9:47:13 PM CDT

## 2024-06-28 ENCOUNTER — PATIENT OUTREACH (OUTPATIENT)
Dept: ADMINISTRATIVE | Facility: HOSPITAL | Age: 49
End: 2024-06-28
Payer: COMMERCIAL

## 2024-07-12 ENCOUNTER — OFFICE VISIT (OUTPATIENT)
Dept: FAMILY MEDICINE | Facility: CLINIC | Age: 49
End: 2024-07-12
Payer: COMMERCIAL

## 2024-07-12 ENCOUNTER — LAB VISIT (OUTPATIENT)
Dept: LAB | Facility: HOSPITAL | Age: 49
End: 2024-07-12
Attending: NURSE PRACTITIONER
Payer: COMMERCIAL

## 2024-07-12 VITALS
DIASTOLIC BLOOD PRESSURE: 82 MMHG | OXYGEN SATURATION: 95 % | SYSTOLIC BLOOD PRESSURE: 122 MMHG | BODY MASS INDEX: 30.37 KG/M2 | HEART RATE: 79 BPM | WEIGHT: 243 LBS

## 2024-07-12 DIAGNOSIS — I10 PRIMARY HYPERTENSION: ICD-10-CM

## 2024-07-12 DIAGNOSIS — Z00.00 ANNUAL PHYSICAL EXAM: Primary | ICD-10-CM

## 2024-07-12 DIAGNOSIS — Z00.00 ANNUAL PHYSICAL EXAM: ICD-10-CM

## 2024-07-12 DIAGNOSIS — Z12.5 SCREENING FOR PROSTATE CANCER: ICD-10-CM

## 2024-07-12 DIAGNOSIS — E66.9 CLASS 1 OBESITY WITH SERIOUS COMORBIDITY AND BODY MASS INDEX (BMI) OF 30.0 TO 30.9 IN ADULT, UNSPECIFIED OBESITY TYPE: ICD-10-CM

## 2024-07-12 LAB
BASOPHILS # BLD AUTO: 0.05 K/UL (ref 0–0.2)
BASOPHILS NFR BLD: 0.9 % (ref 0–1.9)
BILIRUB UR QL STRIP: NEGATIVE
CLARITY UR REFRACT.AUTO: CLEAR
COLOR UR AUTO: YELLOW
COMPLEXED PSA SERPL-MCNC: 0.56 NG/ML (ref 0–4)
DIFFERENTIAL METHOD BLD: ABNORMAL
EOSINOPHIL # BLD AUTO: 0.5 K/UL (ref 0–0.5)
EOSINOPHIL NFR BLD: 8.6 % (ref 0–8)
ERYTHROCYTE [DISTWIDTH] IN BLOOD BY AUTOMATED COUNT: 12.1 % (ref 11.5–14.5)
GLUCOSE UR QL STRIP: NEGATIVE
HCT VFR BLD AUTO: 44.8 % (ref 40–54)
HGB BLD-MCNC: 15.5 G/DL (ref 14–18)
HGB UR QL STRIP: NEGATIVE
IMM GRANULOCYTES # BLD AUTO: 0.03 K/UL (ref 0–0.04)
IMM GRANULOCYTES NFR BLD AUTO: 0.5 % (ref 0–0.5)
KETONES UR QL STRIP: NEGATIVE
LEUKOCYTE ESTERASE UR QL STRIP: NEGATIVE
LYMPHOCYTES # BLD AUTO: 1.7 K/UL (ref 1–4.8)
LYMPHOCYTES NFR BLD: 29.7 % (ref 18–48)
MCH RBC QN AUTO: 30.5 PG (ref 27–31)
MCHC RBC AUTO-ENTMCNC: 34.6 G/DL (ref 32–36)
MCV RBC AUTO: 88 FL (ref 82–98)
MONOCYTES # BLD AUTO: 0.4 K/UL (ref 0.3–1)
MONOCYTES NFR BLD: 7.3 % (ref 4–15)
NEUTROPHILS # BLD AUTO: 3 K/UL (ref 1.8–7.7)
NEUTROPHILS NFR BLD: 53 % (ref 38–73)
NITRITE UR QL STRIP: NEGATIVE
NRBC BLD-RTO: 0 /100 WBC
PH UR STRIP: 6 [PH] (ref 5–8)
PLATELET # BLD AUTO: 218 K/UL (ref 150–450)
PMV BLD AUTO: 10.7 FL (ref 9.2–12.9)
PROT UR QL STRIP: ABNORMAL
RBC # BLD AUTO: 5.09 M/UL (ref 4.6–6.2)
SP GR UR STRIP: 1.03 (ref 1–1.03)
URN SPEC COLLECT METH UR: ABNORMAL
WBC # BLD AUTO: 5.73 K/UL (ref 3.9–12.7)

## 2024-07-12 PROCEDURE — 84439 ASSAY OF FREE THYROXINE: CPT | Performed by: NURSE PRACTITIONER

## 2024-07-12 PROCEDURE — 81003 URINALYSIS AUTO W/O SCOPE: CPT | Performed by: NURSE PRACTITIONER

## 2024-07-12 PROCEDURE — 84153 ASSAY OF PSA TOTAL: CPT | Performed by: NURSE PRACTITIONER

## 2024-07-12 PROCEDURE — 99999 PR PBB SHADOW E&M-EST. PATIENT-LVL III: CPT | Mod: PBBFAC,,, | Performed by: NURSE PRACTITIONER

## 2024-07-12 PROCEDURE — 80061 LIPID PANEL: CPT | Performed by: NURSE PRACTITIONER

## 2024-07-12 PROCEDURE — 84443 ASSAY THYROID STIM HORMONE: CPT | Performed by: NURSE PRACTITIONER

## 2024-07-12 PROCEDURE — 80053 COMPREHEN METABOLIC PANEL: CPT | Performed by: NURSE PRACTITIONER

## 2024-07-12 PROCEDURE — 36415 COLL VENOUS BLD VENIPUNCTURE: CPT | Mod: PO | Performed by: NURSE PRACTITIONER

## 2024-07-12 PROCEDURE — 85025 COMPLETE CBC W/AUTO DIFF WBC: CPT | Performed by: NURSE PRACTITIONER

## 2024-07-12 NOTE — PROGRESS NOTES
Subjective:       Patient ID: Tate Sanford is a 49 y.o. male.    Chief Complaint: Annual Exam  Tate Sanford presents today for routine annual exam. Last seen in 5/2023.    With regards to hypertension:  Current medication regimen: lisinopril-hctz 20-12.5 mg  Compliance: yes   Home BP monitoring: no  Low sodium diet: n/a  Exercise: n/a  Denies cp, palpitations, sob, headaches, dizziness, vision changes, changes in urinary or bowel habits.     HPI per ROS.     Patient Active Problem List   Diagnosis    Hypertension    [place] cardiac risk <10% in next 10 years    LOLITA on CPAP    Overweight (BMI 25.0-29.9)    Elevated serum cholesterol    Erectile dysfunction       Current Outpatient Medications:     aspirin-acetaminophen-caffeine 250-250-65 mg (EXCEDRIN MIGRAINE) 250-250-65 mg per tablet, Take 1 tablet by mouth every 6 (six) hours as needed., Disp: , Rfl:     lisinopriL-hydrochlorothiazide (PRINZIDE,ZESTORETIC) 20-12.5 mg per tablet, TAKE 1 TABLET BY MOUTH ONCE  DAILY, Disp: 90 tablet, Rfl: 3    sildenafil (REVATIO) 20 mg Tab, 60 mg as needed for erectile dysfunction, Disp: 90 tablet, Rfl: 5    The following portions of the patient's history were reviewed and updated as appropriate: allergies, past family history, past medical history, past social history and past surgical history.    Review of Systems   Constitutional:  Negative for fatigue, fever and unexpected weight change.   HENT:  Negative for ear pain, hearing loss, rhinorrhea, sneezing and sore throat.    Eyes:  Negative for visual disturbance.   Respiratory:  Negative for cough, shortness of breath and wheezing.    Cardiovascular:  Negative for chest pain and palpitations.   Gastrointestinal:  Negative for abdominal pain, blood in stool, constipation, diarrhea, nausea and vomiting.   Genitourinary:  Negative for difficulty urinating, frequency and hematuria.   Musculoskeletal:  Negative for arthralgias and myalgias.   Neurological:   Negative for dizziness, tremors and headaches.   Psychiatric/Behavioral:  Negative for dysphoric mood and sleep disturbance. The patient is not nervous/anxious.        Objective:      /82   Pulse 79   Wt 110.2 kg (243 lb)   SpO2 95%   BMI 30.37 kg/m²     Physical Exam  Constitutional:       General: He is not in acute distress.     Appearance: Normal appearance. He is obese.   HENT:      Head: Normocephalic and atraumatic.      Nose: Nose normal.   Eyes:      Extraocular Movements: Extraocular movements intact.      Pupils: Pupils are equal, round, and reactive to light.   Cardiovascular:      Rate and Rhythm: Normal rate and regular rhythm.      Pulses: Normal pulses.      Heart sounds: Normal heart sounds.   Pulmonary:      Effort: Pulmonary effort is normal.      Breath sounds: Normal breath sounds.   Abdominal:      Palpations: Abdomen is soft.   Musculoskeletal:         General: No swelling or deformity. Normal range of motion.      Cervical back: Normal range of motion and neck supple.   Skin:     General: Skin is warm and dry.      Capillary Refill: Capillary refill takes less than 2 seconds.   Neurological:      General: No focal deficit present.      Mental Status: He is alert and oriented to person, place, and time.      Coordination: Coordination normal.      Gait: Gait normal.   Psychiatric:         Mood and Affect: Mood normal.         Behavior: Behavior normal.         Assessment:       1. Annual physical exam    2. Primary hypertension    3. Screening for prostate cancer    4. Class 1 obesity with serious comorbidity and body mass index (BMI) of 30.0 to 30.9 in adult, unspecified obesity type        Plan:   1. Annual physical exam  -     Lipid Panel; Future; Expected date: 07/12/2024  -     Comprehensive Metabolic Panel; Future; Expected date: 07/12/2024  -     CBC Auto Differential; Future; Expected date: 07/26/2024  -     TSH; Future; Expected date: 07/12/2024  -     Urinalysis    2.  Primary hypertension  -     Lipid Panel; Future; Expected date: 07/12/2024  -     Comprehensive Metabolic Panel; Future; Expected date: 07/12/2024  -     CBC Auto Differential; Future; Expected date: 07/26/2024  -     TSH; Future; Expected date: 07/12/2024  -     Urinalysis    3. Screening for prostate cancer  -     PSA, Screening; Future; Expected date: 07/26/2024    4. Class 1 obesity with serious comorbidity and body mass index (BMI) of 30.0 to 30.9 in adult, unspecified obesity type      -- BP at goal; continue current medication regimen  -- Continue home BP monitoring   -- Reduce sodium <2400 mg/day  -- Moderate-to-vigorous exercise 3-4x/week averaging 40 min per session     -- alerted as to the increased risk of T2DM   -- encouraged dietary and lifestyle modifications   -- emphasized weight loss goals   -- recommend periodic A1c

## 2024-07-13 LAB
ALBUMIN SERPL BCP-MCNC: 3.8 G/DL (ref 3.5–5.2)
ALP SERPL-CCNC: 65 U/L (ref 55–135)
ALT SERPL W/O P-5'-P-CCNC: 18 U/L (ref 10–44)
ANION GAP SERPL CALC-SCNC: 12 MMOL/L (ref 8–16)
AST SERPL-CCNC: 14 U/L (ref 10–40)
BILIRUB SERPL-MCNC: 0.4 MG/DL (ref 0.1–1)
BUN SERPL-MCNC: 15 MG/DL (ref 6–20)
CALCIUM SERPL-MCNC: 9.3 MG/DL (ref 8.7–10.5)
CHLORIDE SERPL-SCNC: 106 MMOL/L (ref 95–110)
CHOLEST SERPL-MCNC: 225 MG/DL (ref 120–199)
CHOLEST/HDLC SERPL: 4.2 {RATIO} (ref 2–5)
CO2 SERPL-SCNC: 20 MMOL/L (ref 23–29)
CREAT SERPL-MCNC: 1.2 MG/DL (ref 0.5–1.4)
EST. GFR  (NO RACE VARIABLE): >60 ML/MIN/1.73 M^2
GLUCOSE SERPL-MCNC: 99 MG/DL (ref 70–110)
HDLC SERPL-MCNC: 54 MG/DL (ref 40–75)
HDLC SERPL: 24 % (ref 20–50)
LDLC SERPL CALC-MCNC: 155.2 MG/DL (ref 63–159)
NONHDLC SERPL-MCNC: 171 MG/DL
POTASSIUM SERPL-SCNC: 3.8 MMOL/L (ref 3.5–5.1)
PROT SERPL-MCNC: 6.9 G/DL (ref 6–8.4)
SODIUM SERPL-SCNC: 138 MMOL/L (ref 136–145)
T4 FREE SERPL-MCNC: 1.05 NG/DL (ref 0.71–1.51)
TRIGL SERPL-MCNC: 79 MG/DL (ref 30–150)
TSH SERPL DL<=0.005 MIU/L-ACNC: 2.28 UIU/ML (ref 0.4–4)

## 2025-02-04 ENCOUNTER — OFFICE VISIT (OUTPATIENT)
Dept: DERMATOLOGY | Facility: CLINIC | Age: 50
End: 2025-02-04
Payer: COMMERCIAL

## 2025-02-04 ENCOUNTER — PROCEDURE VISIT (OUTPATIENT)
Dept: DERMATOLOGY | Facility: CLINIC | Age: 50
End: 2025-02-04
Payer: COMMERCIAL

## 2025-02-04 DIAGNOSIS — L91.8 ACROCHORDON: ICD-10-CM

## 2025-02-04 DIAGNOSIS — B36.0 TINEA VERSICOLOR: Primary | ICD-10-CM

## 2025-02-04 DIAGNOSIS — Z41.1 ENCOUNTER FOR COSMETIC PROCEDURE: Primary | ICD-10-CM

## 2025-02-04 PROCEDURE — 11200 RMVL SKIN TAGS UP TO&INC 15: CPT | Mod: CSM,,, | Performed by: DERMATOLOGY

## 2025-02-04 PROCEDURE — 1160F RVW MEDS BY RX/DR IN RCRD: CPT | Mod: CPTII,S$GLB,, | Performed by: DERMATOLOGY

## 2025-02-04 PROCEDURE — 99499 UNLISTED E&M SERVICE: CPT | Mod: CSM,,, | Performed by: DERMATOLOGY

## 2025-02-04 PROCEDURE — 1159F MED LIST DOCD IN RCRD: CPT | Mod: CPTII,S$GLB,, | Performed by: DERMATOLOGY

## 2025-02-04 PROCEDURE — 99213 OFFICE O/P EST LOW 20 MIN: CPT | Mod: S$GLB,,, | Performed by: DERMATOLOGY

## 2025-02-04 RX ORDER — KETOCONAZOLE 20 MG/G
CREAM TOPICAL
Qty: 120 G | Refills: 5 | Status: SHIPPED | OUTPATIENT
Start: 2025-02-04 | End: 2025-02-24

## 2025-02-04 RX ORDER — FLUCONAZOLE 200 MG/1
TABLET ORAL
Qty: 4 TABLET | Refills: 0 | Status: SHIPPED | OUTPATIENT
Start: 2025-02-04

## 2025-02-04 RX ORDER — KETOCONAZOLE 20 MG/ML
SHAMPOO, SUSPENSION TOPICAL
Qty: 240 ML | Refills: 5 | Status: SHIPPED | OUTPATIENT
Start: 2025-02-04 | End: 2025-02-24

## 2025-02-04 NOTE — PROGRESS NOTES
SCISSOR REMOVAL PROCEDURE NOTE    DIAGNOSIS: acrochordons    LOCATION:  back    Discussed with the patient that this procedure is not covered by insurance because treatment of these benign lesions is considered cosmetic. The patient would like to pursue treatment. He understands that he is responsible for the bill and agrees to pay.     Risk, benefits, and alternatives of the procedure are discussed with the patient, including but not limited to the risks of hypopigmentation, hyperpigmentation, scar, infection, recurrence of lesion(s), development of new lesion(s), and need for additional treatment of the lesion(s). Verbal consent obtained from patient. 2 lesions are identified for removal. The areas are cleansed with alcohol. Lidocaine 1% with epinephrine is injected beneath any larger acrochordons. Scissor removal is then performed using sterile, gradle surgical scissors. Given the characteristic appearance, the specimen(s) is(are) not sent for pathology. Hemostasis is obtained using aluminum chloride and/or monopolar hyfrecation if needed. Any larger excision sites are dressed and bandaged.     The patient tolerated the procedure well without adverse event and with negligible blood loss. The patient is advised to keep the excision sites covered to minimize tenderness and to promote healing. Instructions on wound care are given to the patient, who is to call for any bleeding or signs of infection, such as redness or purulent discharge.

## 2025-02-04 NOTE — PROGRESS NOTES
Patient Information  Name: Tate Sanford  : 1975  MRN: [id number]     Referring Physician:  No ref. provider found   Primary Care Physician:  Cliff Cedeno DO   Date of Visit: 25      Subjective:     History of Present lllness:    Tate Sanford is a 49 y.o. male who presents with a chief complaint of skin tag and tinea.    Location: back  Duration: years  Signs/Symptoms: skin tag like growth  Relieving factors/Prior treatments: none    Location: chest and back  Duration: years  Signs/Symptoms: skin discoloration  Relieving factors/Prior treatments: none    Patient was last seen: 2023.  Prior notes by myself reviewed.   Clinical documentation obtained by nursing staff reviewed.    Review of Systems    Objective:   Physical Exam   Constitutional: He appears well-developed and well-nourished. No distress.   Neurological: He is alert and oriented to person, place, and time. He is not disoriented.   Psychiatric: He has a normal mood and affect.   Skin:   Areas Examined (abnormalities noted in diagram):   Chest / Axilla Inspection Performed  Back Inspection Performed            Diagram Legend     Erythematous scaling macule/papule c/w actinic keratosis       Vascular papule c/w angioma      Pigmented verrucoid papule/plaque c/w seborrheic keratosis      Yellow umbilicated papule c/w sebaceous hyperplasia      Irregularly shaped tan macule c/w lentigo     1-2 mm smooth white papules consistent with Milia      Movable subcutaneous cyst with punctum c/w epidermal inclusion cyst      Subcutaneous movable cyst c/w pilar cyst      Firm pink to brown papule c/w dermatofibroma      Pedunculated fleshy papule(s) c/w skin tag(s)      Evenly pigmented macule c/w junctional nevus     Mildly variegated pigmented, slightly irregular-bordered macule c/w mildly atypical nevus      Flesh colored to evenly pigmented papule c/w intradermal nevus       Pink pearly papule/plaque c/w basal cell  carcinoma      Erythematous hyperkeratotic cursted plaque c/w SCC      Surgical scar with no sign of skin cancer recurrence      Open and closed comedones      Inflammatory papules and pustules      Verrucoid papule consistent consistent with wart     Erythematous eczematous patches and plaques     Dystrophic onycholytic nail with subungual debris c/w onychomycosis     Umbilicated papule    Erythematous-base heme-crusted tan verrucoid plaque consistent with inflamed seborrheic keratosis     Erythematous Silvery Scaling Plaque c/w Psoriasis     See annotation    No images are attached to the encounter or orders placed in the encounter.      [] Data reviewed  [] Prior external notes reviewed  [] Independent review of test  [] Management discussed with another provider  [] Independent historian    Assessment / Plan:        Tinea versicolor  -     fluconazole (DIFLUCAN) 200 MG Tab; Take 1 tab po twice weekly.  Dispense: 4 tablet; Refill: 0  Potential side effects of fluconazole include but are not limited to headache, GI symptoms (N/V, diarrhea) and rarely rash or abnormal liver function tests.   These side effects are minimized by taking the medication only once or twice per week.    -     ketoconazole (NIZORAL) 2 % cream; Apply to affected areas of chest/back BID x 1 mo.  Dispense: 120 g; Refill: 5  -     ketoconazole (NIZORAL) 2 % shampoo; Apply to affected areas of chest/back BID x 1 mo.  Dispense: 240 mL; Refill: 5    Acrochordon  Reassurance was given to the patient. No treatment is necessary.  Discussed with the patient that treatment of these benign lesions is not covered by insurance as it is considered cosmetic. Warned about risk of scarring and hypo- or hyperpigmentation with treatment, as well as risk of recurrence and/or development of more lesions.         Follow up for any new problems or changing lesions.      Chaya Yadav MD, FAAD  [facility]

## 2025-02-05 RX ORDER — LISINOPRIL AND HYDROCHLOROTHIAZIDE 12.5; 2 MG/1; MG/1
1 TABLET ORAL DAILY
Qty: 90 TABLET | Refills: 3 | Status: SHIPPED | OUTPATIENT
Start: 2025-02-05

## 2025-02-05 NOTE — TELEPHONE ENCOUNTER
Care Due:                  Date            Visit Type   Department     Provider  --------------------------------------------------------------------------------    Last Visit: None Found      None         None Found  Next Visit: None Scheduled  None         None Found                                                            Last  Test          Frequency    Reason                     Performed    Due Date  --------------------------------------------------------------------------------    Office Visit  15 months..  lisinopriL-hydrochlorothi  Not Found    Overdue                             azide....................    [employer] Embedded Care Due Messages. Reference number: [id number].   2/05/2025 4:43:57 AM CST

## 2025-02-05 NOTE — TELEPHONE ENCOUNTER
Refill Encounter    PCP Visits: Recent Visits  No visits were found meeting these conditions.  Showing recent visits within past 360 days and meeting all other requirements  Future Appointments  No visits were found meeting these conditions.  Showing future appointments within next 720 days and meeting all other requirements     Last 3 Blood Pressure:   BP Readings from Last 3 Encounters:   07/12/24 122/82   05/08/23 126/68   03/31/22 136/86     Preferred Pharmacy:   [facility] [address]  [address] [address]  Phone: [phone] Fax: [phone]    Requested RX:  Requested Prescriptions     Pending Prescriptions Disp Refills    lisinopriL-hydrochlorothiazide (PRINZIDE,ZESTORETIC) 20-12.5 mg per tablet [Pharmacy Med Name: Lisinopril-hydroCHLOROthiazide 20-12.5 MG Oral Tablet] 90 tablet 3     Sig: TAKE 1 TABLET BY MOUTH ONCE  DAILY      RX Route: Normal

## 2025-02-24 ENCOUNTER — OFFICE VISIT (OUTPATIENT)
Dept: FAMILY MEDICINE | Facility: CLINIC | Age: 50
End: 2025-02-24
Attending: FAMILY MEDICINE
Payer: COMMERCIAL

## 2025-02-24 VITALS
BODY MASS INDEX: 28.12 KG/M2 | DIASTOLIC BLOOD PRESSURE: 89 MMHG | WEIGHT: 225 LBS | HEART RATE: 99 BPM | OXYGEN SATURATION: 97 % | SYSTOLIC BLOOD PRESSURE: 128 MMHG

## 2025-02-24 DIAGNOSIS — I10 ESSENTIAL HYPERTENSION: Primary | ICD-10-CM

## 2025-02-24 DIAGNOSIS — E78.2 MIXED HYPERLIPIDEMIA: ICD-10-CM

## 2025-02-24 PROCEDURE — 99999 PR PBB SHADOW E&M-EST. PATIENT-LVL III: CPT | Mod: PBBFAC,,, | Performed by: FAMILY MEDICINE

## 2025-02-24 NOTE — PROGRESS NOTES
"Subjective:       Patient ID: Tate Sanford is a 49 y.o. male.    Chief Complaint: Establish Care    HPI  Pt is here for follow up of htn bp fine no sob/cp on ace hctz no cough muscle cramps   Pt has mildly elevated lipid panel on record not on low fat low salt diet consistently    Review of Systems   Constitutional:  Negative for chills, fatigue and fever.   Respiratory:  Negative for cough, chest tightness and shortness of breath.    Cardiovascular:  Negative for chest pain and palpitations.   Gastrointestinal:  Negative for abdominal distention, abdominal pain and blood in stool.   Endocrine: Negative for polydipsia and polyuria.       Objective:    /89   Pulse 99   Wt 102.1 kg (225 lb)   SpO2 97%   BMI 28.12 kg/m²     Physical Exam  Constitutional:       Appearance: Normal appearance. He is not ill-appearing.   Cardiovascular:      Rate and Rhythm: Normal rate and regular rhythm.      Heart sounds:      No gallop.   Pulmonary:      Effort: Pulmonary effort is normal. No respiratory distress.   Neurological:      General: No focal deficit present.      Mental Status: He is alert and oriented to person, place, and time.      Cranial Nerves: No cranial nerve deficit.      Coordination: Coordination normal.   Psychiatric:         Mood and Affect: Mood normal.         Behavior: Behavior normal.         Thought Content: Thought content normal.         Judgment: Judgment normal.       Ldl 155 in 7/2024  Assessment:       1. Essential hypertension    2. Mixed hyperlipidemia        Plan:     Orders cmp lipid  Cont meds  Low fat low salt diet  Graded exerciser  Rtc 6 months       "This note will not be shared with the patient."     "

## 2025-02-28 ENCOUNTER — LAB VISIT (OUTPATIENT)
Dept: LAB | Facility: HOSPITAL | Age: 50
End: 2025-02-28
Attending: FAMILY MEDICINE
Payer: COMMERCIAL

## 2025-02-28 DIAGNOSIS — I10 ESSENTIAL HYPERTENSION: ICD-10-CM

## 2025-02-28 LAB
ALBUMIN SERPL BCP-MCNC: 4 G/DL (ref 3.5–5.2)
ALP SERPL-CCNC: 58 U/L (ref 40–150)
ALT SERPL W/O P-5'-P-CCNC: 16 U/L (ref 10–44)
ANION GAP SERPL CALC-SCNC: 12 MMOL/L (ref 8–16)
AST SERPL-CCNC: 21 U/L (ref 10–40)
BILIRUB SERPL-MCNC: 0.5 MG/DL (ref 0.1–1)
BUN SERPL-MCNC: 22 MG/DL (ref 6–20)
CALCIUM SERPL-MCNC: 9.2 MG/DL (ref 8.7–10.5)
CHLORIDE SERPL-SCNC: 106 MMOL/L (ref 95–110)
CHOLEST SERPL-MCNC: 232 MG/DL (ref 120–199)
CHOLEST/HDLC SERPL: 4.9 {RATIO} (ref 2–5)
CO2 SERPL-SCNC: 22 MMOL/L (ref 23–29)
CREAT SERPL-MCNC: 1.1 MG/DL (ref 0.5–1.4)
EST. GFR  (NO RACE VARIABLE): >60 ML/MIN/1.73 M^2
GLUCOSE SERPL-MCNC: 98 MG/DL (ref 70–110)
HDLC SERPL-MCNC: 47 MG/DL (ref 40–75)
HDLC SERPL: 20.3 % (ref 20–50)
LDLC SERPL CALC-MCNC: 165.8 MG/DL (ref 63–159)
NONHDLC SERPL-MCNC: 185 MG/DL
POTASSIUM SERPL-SCNC: 4.1 MMOL/L (ref 3.5–5.1)
PROT SERPL-MCNC: 7 G/DL (ref 6–8.4)
SODIUM SERPL-SCNC: 140 MMOL/L (ref 136–145)
TRIGL SERPL-MCNC: 96 MG/DL (ref 30–150)

## 2025-02-28 PROCEDURE — 36415 COLL VENOUS BLD VENIPUNCTURE: CPT | Mod: PO | Performed by: FAMILY MEDICINE

## 2025-02-28 PROCEDURE — 80061 LIPID PANEL: CPT | Performed by: FAMILY MEDICINE

## 2025-02-28 PROCEDURE — 80053 COMPREHEN METABOLIC PANEL: CPT | Performed by: FAMILY MEDICINE

## 2025-03-29 ENCOUNTER — PATIENT MESSAGE (OUTPATIENT)
Dept: FAMILY MEDICINE | Facility: CLINIC | Age: 50
End: 2025-03-29
Payer: COMMERCIAL

## 2025-03-29 DIAGNOSIS — E78.2 MIXED HYPERLIPIDEMIA: Primary | ICD-10-CM

## 2025-03-31 NOTE — TELEPHONE ENCOUNTER
Please help pt schedule a virtual with lab pta this summer per Dr. Fernández. Thanks   Statement Selected

## 2025-07-14 ENCOUNTER — OFFICE VISIT (OUTPATIENT)
Dept: FAMILY MEDICINE | Facility: CLINIC | Age: 50
End: 2025-07-14
Attending: FAMILY MEDICINE
Payer: COMMERCIAL

## 2025-07-14 ENCOUNTER — LAB VISIT (OUTPATIENT)
Dept: LAB | Facility: HOSPITAL | Age: 50
End: 2025-07-14
Attending: FAMILY MEDICINE
Payer: COMMERCIAL

## 2025-07-14 VITALS
BODY MASS INDEX: 25.5 KG/M2 | OXYGEN SATURATION: 98 % | WEIGHT: 204 LBS | HEART RATE: 77 BPM | SYSTOLIC BLOOD PRESSURE: 123 MMHG | DIASTOLIC BLOOD PRESSURE: 84 MMHG

## 2025-07-14 DIAGNOSIS — Z00.00 ANNUAL PHYSICAL EXAM: ICD-10-CM

## 2025-07-14 DIAGNOSIS — Z12.5 SCREENING FOR PROSTATE CANCER: ICD-10-CM

## 2025-07-14 DIAGNOSIS — I10 ESSENTIAL HYPERTENSION: ICD-10-CM

## 2025-07-14 DIAGNOSIS — Z00.00 ANNUAL PHYSICAL EXAM: Primary | ICD-10-CM

## 2025-07-14 LAB
ABSOLUTE EOSINOPHIL (OHS): 0.52 K/UL
ABSOLUTE MONOCYTE (OHS): 0.38 K/UL (ref 0.3–1)
ABSOLUTE NEUTROPHIL COUNT (OHS): 2.62 K/UL (ref 1.8–7.7)
ALBUMIN SERPL BCP-MCNC: 4 G/DL (ref 3.5–5.2)
ALBUMIN/CREAT UR: 3.9 UG/MG
ALP SERPL-CCNC: 66 UNIT/L (ref 40–150)
ALT SERPL W/O P-5'-P-CCNC: 18 UNIT/L (ref 10–44)
ANION GAP (OHS): 9 MMOL/L (ref 8–16)
AST SERPL-CCNC: 15 UNIT/L (ref 11–45)
BASOPHILS # BLD AUTO: 0.04 K/UL
BASOPHILS NFR BLD AUTO: 0.8 %
BILIRUB SERPL-MCNC: 0.5 MG/DL (ref 0.1–1)
BUN SERPL-MCNC: 18 MG/DL (ref 6–20)
CALCIUM SERPL-MCNC: 8.6 MG/DL (ref 8.7–10.5)
CHLORIDE SERPL-SCNC: 106 MMOL/L (ref 95–110)
CHOLEST SERPL-MCNC: 204 MG/DL (ref 120–199)
CHOLEST/HDLC SERPL: 4.3 {RATIO} (ref 2–5)
CO2 SERPL-SCNC: 26 MMOL/L (ref 23–29)
CREAT SERPL-MCNC: 1.1 MG/DL (ref 0.5–1.4)
CREAT UR-MCNC: 152 MG/DL (ref 23–375)
EAG (OHS): 105 MG/DL (ref 68–131)
ERYTHROCYTE [DISTWIDTH] IN BLOOD BY AUTOMATED COUNT: 12.5 % (ref 11.5–14.5)
GFR SERPLBLD CREATININE-BSD FMLA CKD-EPI: >60 ML/MIN/1.73/M2
GLUCOSE SERPL-MCNC: 98 MG/DL (ref 70–110)
HBA1C MFR BLD: 5.3 % (ref 4–5.6)
HCT VFR BLD AUTO: 43.1 % (ref 40–54)
HDLC SERPL-MCNC: 47 MG/DL (ref 40–75)
HDLC SERPL: 23 % (ref 20–50)
HGB BLD-MCNC: 14.3 GM/DL (ref 14–18)
IMM GRANULOCYTES # BLD AUTO: 0.01 K/UL (ref 0–0.04)
IMM GRANULOCYTES NFR BLD AUTO: 0.2 % (ref 0–0.5)
LDLC SERPL CALC-MCNC: 134.8 MG/DL (ref 63–159)
LYMPHOCYTES # BLD AUTO: 1.71 K/UL (ref 1–4.8)
MCH RBC QN AUTO: 29.7 PG (ref 27–31)
MCHC RBC AUTO-ENTMCNC: 33.2 G/DL (ref 32–36)
MCV RBC AUTO: 89 FL (ref 82–98)
MICROALBUMIN UR-MCNC: 6 UG/ML (ref ?–5000)
NONHDLC SERPL-MCNC: 157 MG/DL
NUCLEATED RBC (/100WBC) (OHS): 0 /100 WBC
PLATELET # BLD AUTO: 203 K/UL (ref 150–450)
PMV BLD AUTO: 10.8 FL (ref 9.2–12.9)
POTASSIUM SERPL-SCNC: 3.8 MMOL/L (ref 3.5–5.1)
PROT SERPL-MCNC: 6.7 GM/DL (ref 6–8.4)
PSA SERPL-MCNC: 0.62 NG/ML
RBC # BLD AUTO: 4.82 M/UL (ref 4.6–6.2)
RELATIVE EOSINOPHIL (OHS): 9.8 %
RELATIVE LYMPHOCYTE (OHS): 32.4 % (ref 18–48)
RELATIVE MONOCYTE (OHS): 7.2 % (ref 4–15)
RELATIVE NEUTROPHIL (OHS): 49.6 % (ref 38–73)
SODIUM SERPL-SCNC: 141 MMOL/L (ref 136–145)
TRIGL SERPL-MCNC: 111 MG/DL (ref 30–150)
TSH SERPL-ACNC: 2.7 UIU/ML (ref 0.4–4)
WBC # BLD AUTO: 5.28 K/UL (ref 3.9–12.7)

## 2025-07-14 PROCEDURE — 84153 ASSAY OF PSA TOTAL: CPT

## 2025-07-14 PROCEDURE — 99999 PR PBB SHADOW E&M-EST. PATIENT-LVL III: CPT | Mod: PBBFAC,,, | Performed by: FAMILY MEDICINE

## 2025-07-14 PROCEDURE — 1159F MED LIST DOCD IN RCRD: CPT | Mod: CPTII,S$GLB,, | Performed by: FAMILY MEDICINE

## 2025-07-14 PROCEDURE — 99396 PREV VISIT EST AGE 40-64: CPT | Mod: S$GLB,,, | Performed by: FAMILY MEDICINE

## 2025-07-14 PROCEDURE — 83036 HEMOGLOBIN GLYCOSYLATED A1C: CPT

## 2025-07-14 PROCEDURE — 3074F SYST BP LT 130 MM HG: CPT | Mod: CPTII,S$GLB,, | Performed by: FAMILY MEDICINE

## 2025-07-14 PROCEDURE — 4010F ACE/ARB THERAPY RXD/TAKEN: CPT | Mod: CPTII,S$GLB,, | Performed by: FAMILY MEDICINE

## 2025-07-14 PROCEDURE — 3079F DIAST BP 80-89 MM HG: CPT | Mod: CPTII,S$GLB,, | Performed by: FAMILY MEDICINE

## 2025-07-14 PROCEDURE — 82435 ASSAY OF BLOOD CHLORIDE: CPT

## 2025-07-14 PROCEDURE — 36415 COLL VENOUS BLD VENIPUNCTURE: CPT | Mod: PO

## 2025-07-14 PROCEDURE — 83718 ASSAY OF LIPOPROTEIN: CPT

## 2025-07-14 PROCEDURE — 84443 ASSAY THYROID STIM HORMONE: CPT

## 2025-07-14 PROCEDURE — 85025 COMPLETE CBC W/AUTO DIFF WBC: CPT

## 2025-07-14 PROCEDURE — 3008F BODY MASS INDEX DOCD: CPT | Mod: CPTII,S$GLB,, | Performed by: FAMILY MEDICINE

## 2025-07-14 PROCEDURE — 82570 ASSAY OF URINE CREATININE: CPT | Performed by: FAMILY MEDICINE

## 2025-07-14 RX ORDER — LISINOPRIL AND HYDROCHLOROTHIAZIDE 12.5; 2 MG/1; MG/1
1 TABLET ORAL DAILY
Qty: 90 TABLET | Refills: 3 | Status: SHIPPED | OUTPATIENT
Start: 2025-07-14

## 2025-07-14 NOTE — PROGRESS NOTES
Subjective:       Patient ID: Tate Sanford is a 50 y.o. male.    Chief Complaint: Annual Exam    HPI  Pt is here for annual exam pt is generally well no sob/cp cough chest congestion sore throat uri symptoms  Pt denies n/v/f/c/d/c no change in bowel habits no brbpr  Pt denies dysuria hematuria no acute urinary complaints  Pt has htn bp fine no cough on ace no muscle cramps    Review of Systems   Constitutional:  Negative for activity change, chills, fatigue and fever.   HENT:  Negative for congestion, ear pain, hearing loss, postnasal drip, rhinorrhea, sinus pressure and sore throat.    Eyes:  Negative for photophobia, pain, redness and visual disturbance.   Respiratory:  Negative for cough, chest tightness and shortness of breath.    Cardiovascular:  Negative for chest pain, palpitations and leg swelling.   Gastrointestinal:  Negative for abdominal pain, blood in stool, constipation, diarrhea, nausea and vomiting.   Genitourinary:  Negative for decreased urine volume, difficulty urinating, dysuria, frequency, penile discharge and urgency.   Musculoskeletal:  Negative for back pain, joint swelling and neck pain.   Skin:  Negative for color change, pallor and rash.   Neurological:  Negative for dizziness, seizures, speech difficulty and numbness.   Hematological:  Does not bruise/bleed easily.   Psychiatric/Behavioral:  Negative for behavioral problems, confusion, decreased concentration and suicidal ideas.        Objective:    /84   Pulse 77   Wt 92.5 kg (204 lb)   SpO2 98%   BMI 25.50 kg/m²     Physical Exam  Constitutional:       General: He is not in acute distress.     Appearance: Normal appearance. He is well-developed. He is not ill-appearing.   HENT:      Head: Normocephalic and atraumatic.      Nose: Nose normal.   Eyes:      Pupils: Pupils are equal, round, and reactive to light.   Neck:      Thyroid: No thyromegaly.   Cardiovascular:      Rate and Rhythm: Normal rate and regular  "rhythm.      Heart sounds: Normal heart sounds. No murmur heard.     No friction rub. No gallop.   Pulmonary:      Effort: Pulmonary effort is normal. No respiratory distress.      Breath sounds: Normal breath sounds.   Abdominal:      General: Bowel sounds are normal. There is no distension.      Palpations: Abdomen is soft.      Tenderness: There is no abdominal tenderness. There is no guarding or rebound.   Genitourinary:     Comments: declined  Musculoskeletal:         General: No tenderness. Normal range of motion.      Cervical back: Normal range of motion and neck supple.   Skin:     General: Skin is warm and dry.      Findings: No erythema.   Neurological:      General: No focal deficit present.      Mental Status: He is alert and oriented to person, place, and time.      Cranial Nerves: No cranial nerve deficit.      Coordination: Coordination normal.   Psychiatric:         Behavior: Behavior normal.         Thought Content: Thought content normal.         Judgment: Judgment normal.         Assessment:       1. Annual physical exam    2. Essential hypertension    3. Screening for prostate cancer        Plan:     Orders cmp cbc lipid tsh urine hgb A1c  Cont meds  Low salt diet  Graded exercise  Rtc 6 months    Health maintenance  Discussed with pt        "This note will not be shared with the patient."     "

## 2025-07-18 ENCOUNTER — HOSPITAL ENCOUNTER (OUTPATIENT)
Dept: RADIOLOGY | Facility: OTHER | Age: 50
Discharge: HOME | End: 2025-07-18
Attending: FAMILY MEDICINE
Payer: COMMERCIAL

## 2025-07-18 ENCOUNTER — HOSPITAL ENCOUNTER (OUTPATIENT)
Dept: CARDIOLOGY | Facility: OTHER | Age: 50
Discharge: HOME | End: 2025-07-18
Attending: FAMILY MEDICINE
Payer: COMMERCIAL

## 2025-07-18 DIAGNOSIS — I10 ESSENTIAL HYPERTENSION: ICD-10-CM

## 2025-07-18 DIAGNOSIS — Z00.00 ANNUAL PHYSICAL EXAM: ICD-10-CM

## 2025-07-18 LAB
OHS QRS DURATION: 92 MS
OHS QTC CALCULATION: 435 MS

## 2025-07-18 PROCEDURE — 71046 X-RAY EXAM CHEST 2 VIEWS: CPT | Mod: TC

## 2025-07-18 PROCEDURE — 93005 ELECTROCARDIOGRAM TRACING: CPT

## 2025-07-18 PROCEDURE — 71046 X-RAY EXAM CHEST 2 VIEWS: CPT | Mod: 26,,, | Performed by: RADIOLOGY

## 2025-08-19 ENCOUNTER — ON-DEMAND VIRTUAL (OUTPATIENT)
Dept: URGENT CARE | Facility: CLINIC | Age: 50
End: 2025-08-19
Payer: COMMERCIAL

## 2025-08-19 DIAGNOSIS — Z20.7 EXPOSURE TO SCABIES: Primary | ICD-10-CM

## 2025-08-19 PROCEDURE — 98005 SYNCH AUDIO-VIDEO EST LOW 20: CPT | Mod: 95,,, | Performed by: NURSE PRACTITIONER

## 2025-08-19 RX ORDER — PERMETHRIN 50 MG/G
CREAM TOPICAL ONCE
Qty: 120 G | Refills: 0 | Status: SHIPPED | OUTPATIENT
Start: 2025-08-19 | End: 2025-08-19